# Patient Record
Sex: FEMALE | Race: WHITE | Employment: UNEMPLOYED | URBAN - METROPOLITAN AREA
[De-identification: names, ages, dates, MRNs, and addresses within clinical notes are randomized per-mention and may not be internally consistent; named-entity substitution may affect disease eponyms.]

---

## 2017-06-01 PROCEDURE — 87624 HPV HI-RISK TYP POOLED RSLT: CPT | Performed by: FAMILY MEDICINE

## 2017-06-01 PROCEDURE — 88175 CYTOPATH C/V AUTO FLUID REDO: CPT | Performed by: FAMILY MEDICINE

## 2020-06-11 PROCEDURE — 88342 IMHCHEM/IMCYTCHM 1ST ANTB: CPT | Performed by: RADIOLOGY

## 2020-06-11 PROCEDURE — 88341 IMHCHEM/IMCYTCHM EA ADD ANTB: CPT | Performed by: RADIOLOGY

## 2020-06-11 PROCEDURE — 88305 TISSUE EXAM BY PATHOLOGIST: CPT | Performed by: RADIOLOGY

## 2021-11-16 ENCOUNTER — OFFICE VISIT (OUTPATIENT)
Dept: FAMILY MEDICINE CLINIC | Facility: CLINIC | Age: 66
End: 2021-11-16
Payer: MEDICARE

## 2021-11-16 VITALS
HEIGHT: 65.5 IN | OXYGEN SATURATION: 98 % | BODY MASS INDEX: 31.93 KG/M2 | HEART RATE: 106 BPM | WEIGHT: 194 LBS | RESPIRATION RATE: 16 BRPM | SYSTOLIC BLOOD PRESSURE: 128 MMHG | DIASTOLIC BLOOD PRESSURE: 86 MMHG

## 2021-11-16 DIAGNOSIS — Z12.11 COLON CANCER SCREENING: ICD-10-CM

## 2021-11-16 DIAGNOSIS — R53.83 OTHER FATIGUE: ICD-10-CM

## 2021-11-16 DIAGNOSIS — E78.00 ELEVATED CHOLESTEROL: ICD-10-CM

## 2021-11-16 DIAGNOSIS — Z12.31 ENCOUNTER FOR SCREENING MAMMOGRAM FOR HIGH-RISK PATIENT: ICD-10-CM

## 2021-11-16 DIAGNOSIS — R03.0 ELEVATED BLOOD PRESSURE READING: ICD-10-CM

## 2021-11-16 DIAGNOSIS — F41.9 ANXIETY: ICD-10-CM

## 2021-11-16 DIAGNOSIS — R73.9 HYPERGLYCEMIA: ICD-10-CM

## 2021-11-16 DIAGNOSIS — M72.0 DUPUYTREN'S CONTRACTURE: ICD-10-CM

## 2021-11-16 DIAGNOSIS — Z13.820 SCREENING FOR OSTEOPOROSIS: ICD-10-CM

## 2021-11-16 DIAGNOSIS — F98.8 ATTENTION DEFICIT DISORDER (ADD) IN ADULT: ICD-10-CM

## 2021-11-16 DIAGNOSIS — K76.0 FATTY INFILTRATION OF LIVER: ICD-10-CM

## 2021-11-16 DIAGNOSIS — R74.8 ABNORMAL LIVER ENZYMES: ICD-10-CM

## 2021-11-16 DIAGNOSIS — F32.5 MAJOR DEPRESSIVE DISORDER WITH SINGLE EPISODE, IN FULL REMISSION (HCC): ICD-10-CM

## 2021-11-16 DIAGNOSIS — M25.561 RIGHT KNEE PAIN, UNSPECIFIED CHRONICITY: ICD-10-CM

## 2021-11-16 DIAGNOSIS — K80.20 CHOLELITHIASIS WITHOUT CHOLANGITIS: ICD-10-CM

## 2021-11-16 DIAGNOSIS — Z78.0 ASYMPTOMATIC MENOPAUSE: ICD-10-CM

## 2021-11-16 DIAGNOSIS — Z00.00 ENCOUNTER FOR ANNUAL HEALTH EXAMINATION: Primary | ICD-10-CM

## 2021-11-16 DIAGNOSIS — E55.9 VITAMIN D DEFICIENCY: ICD-10-CM

## 2021-11-16 PROCEDURE — 3008F BODY MASS INDEX DOCD: CPT | Performed by: FAMILY MEDICINE

## 2021-11-16 PROCEDURE — 99387 INIT PM E/M NEW PAT 65+ YRS: CPT | Performed by: FAMILY MEDICINE

## 2021-11-16 PROCEDURE — 96160 PT-FOCUSED HLTH RISK ASSMT: CPT | Performed by: FAMILY MEDICINE

## 2021-11-16 PROCEDURE — 3079F DIAST BP 80-89 MM HG: CPT | Performed by: FAMILY MEDICINE

## 2021-11-16 PROCEDURE — G0438 PPPS, INITIAL VISIT: HCPCS | Performed by: FAMILY MEDICINE

## 2021-11-16 PROCEDURE — 3074F SYST BP LT 130 MM HG: CPT | Performed by: FAMILY MEDICINE

## 2021-11-16 RX ORDER — TRAZODONE HYDROCHLORIDE 50 MG/1
50 TABLET ORAL NIGHTLY
COMMUNITY
Start: 2021-10-05

## 2021-11-16 RX ORDER — DEXTROAMPHETAMINE SACCHARATE, AMPHETAMINE ASPARTATE, DEXTROAMPHETAMINE SULFATE AND AMPHETAMINE SULFATE 5; 5; 5; 5 MG/1; MG/1; MG/1; MG/1
20 TABLET ORAL DAILY
COMMUNITY
Start: 2021-10-26

## 2021-11-16 RX ORDER — METOPROLOL SUCCINATE 25 MG/1
25 TABLET, EXTENDED RELEASE ORAL DAILY
Qty: 60 TABLET | Refills: 0 | Status: SHIPPED | OUTPATIENT
Start: 2021-11-16 | End: 2022-01-08

## 2021-11-16 NOTE — PATIENT INSTRUCTIONS
Kaley Thorpe's SCREENING SCHEDULE   Tests on this list are recommended by your physician but may not be covered, or covered at this frequency, by your insurer. Please check with your insurance carrier before scheduling to verify coverage.    Khushbu King visit.      a[employer] [place] Never done   Pap and Pelvic    Pap   Covered every 2 years for women at normal risk;  Annually if at high risk -  No recommendations at this time    Chlamydia Annually if high risk -  No recommendations at this time   Screening Mammogr 03/12/2020         BUN Annually Lab Results   Component Value Date    BUN 9.0 03/12/2020       Drug Serum Conc Annually No results found for: DIGOXIN, DIG, VALP              Recommended Websites for Advanced Directives    [email or web address]

## 2021-11-16 NOTE — PROGRESS NOTES
HPI:   Oralia Mckeon is a 77year old female who presents for a MA (Medicare Advantage) Supervisit (Once per calendar year). No topic due editable text        She has been screened for Falls and is low risk.       Cognitive Assessment     What d reading     Hyperglycemia     Elevated cholesterol     Dupuytren's contracture     Other fatigue    Wt Readings from Last 3 Encounters:  11/16/21 : 194 lb (88 kg)  05/26/21 : 190 lb (86.2 kg)  03/11/20 : 188 lb (85.3 kg)     Last Cholesterol Labs:   Lab Re use drugs.      REVIEW OF SYSTEMS:   GENERAL: feels well otherwise  SKIN: denies any unusual skin lesions  EYES: denies blurred vision or double vision  HEENT: denies nasal congestion, sinus pain or ST  LUNGS: denies shortness of breath with exertion  CARDI S2 normal, no murmur, rub, or gallop   Abdomen:   Soft, non-tender, bowel sounds active all four quadrants,  no masses, no organomegaly   Pelvic: Deferred   Extremities: Extremities normal, atraumatic, no cyanosis or edema   Pulses: 2+ and symmetric   Skin Future    Elevated cholesterol- check labs  -     LIPID PANEL;  Future    Vitamin D deficiency- check labs   -     VITAMIN D, 25-HYDROXY; Future    Dupuytren's contracture- ortho consult discussed     Other fatigue- check labs   -     CBC WITH DIFFERENTIAL Cardiovascular Disease Screening    Lipid Panel  Cholesterol  Lipoprotein (HDL)  Triglycerides Covered every 5 years for all Medicare beneficiaries without apparent signs or symptoms of cardiovascular disease Lab Results   Component Value Date    CHOLEST 13: -    [id number]: -     Pneumococcal Vaccination(1 of 1 - PPSV23) Never done    Hepatitis B One screening covered for patients with certain risk factors   -  No recommendations at this time    Tetanus Toxoid Not covered by Medicare Part B [facility] medica

## 2021-11-18 ENCOUNTER — HOSPITAL ENCOUNTER (OUTPATIENT)
Dept: GENERAL RADIOLOGY | Facility: HOSPITAL | Age: 66
Discharge: HOME | End: 2021-11-18
Attending: FAMILY MEDICINE
Payer: MEDICARE

## 2021-11-18 ENCOUNTER — HOSPITAL ENCOUNTER (OUTPATIENT)
Dept: MAMMOGRAPHY | Facility: HOSPITAL | Age: 66
Discharge: HOME | End: 2021-11-18
Attending: FAMILY MEDICINE
Payer: MEDICARE

## 2021-11-18 DIAGNOSIS — Z01.89 ENCOUNTER FOR LOWER EXTREMITY COMPARISON IMAGING STUDY: ICD-10-CM

## 2021-11-18 DIAGNOSIS — Z12.31 ENCOUNTER FOR SCREENING MAMMOGRAM FOR HIGH-RISK PATIENT: ICD-10-CM

## 2021-11-18 DIAGNOSIS — M25.561 RIGHT KNEE PAIN, UNSPECIFIED CHRONICITY: ICD-10-CM

## 2021-11-18 PROCEDURE — 77063 BREAST TOMOSYNTHESIS BI: CPT | Performed by: FAMILY MEDICINE

## 2021-11-18 PROCEDURE — 73562 X-RAY EXAM OF KNEE 3: CPT | Performed by: FAMILY MEDICINE

## 2021-11-18 PROCEDURE — 73564 X-RAY EXAM KNEE 4 OR MORE: CPT | Performed by: FAMILY MEDICINE

## 2021-11-18 PROCEDURE — 77067 SCR MAMMO BI INCL CAD: CPT | Performed by: FAMILY MEDICINE

## 2021-11-19 PROCEDURE — 3044F HG A1C LEVEL LT 7.0%: CPT | Performed by: FAMILY MEDICINE

## 2021-11-19 PROCEDURE — 3061F NEG MICROALBUMINURIA REV: CPT | Performed by: FAMILY MEDICINE

## 2021-11-30 ENCOUNTER — NURSE ONLY (OUTPATIENT)
Dept: FAMILY MEDICINE CLINIC | Facility: CLINIC | Age: 66
End: 2021-11-30
Payer: MEDICARE

## 2021-11-30 ENCOUNTER — HOSPITAL ENCOUNTER (OUTPATIENT)
Dept: BONE DENSITY | Age: 66
Discharge: HOME | End: 2021-11-30
Attending: FAMILY MEDICINE
Payer: MEDICARE

## 2021-11-30 VITALS — SYSTOLIC BLOOD PRESSURE: 143 MMHG | DIASTOLIC BLOOD PRESSURE: 89 MMHG

## 2021-11-30 DIAGNOSIS — Z78.0 ASYMPTOMATIC MENOPAUSE: ICD-10-CM

## 2021-11-30 DIAGNOSIS — Z13.820 SCREENING FOR OSTEOPOROSIS: ICD-10-CM

## 2021-11-30 PROCEDURE — 3079F DIAST BP 80-89 MM HG: CPT | Performed by: FAMILY MEDICINE

## 2021-11-30 PROCEDURE — 77080 DXA BONE DENSITY AXIAL: CPT | Performed by: FAMILY MEDICINE

## 2021-11-30 PROCEDURE — 3077F SYST BP >= 140 MM HG: CPT | Performed by: FAMILY MEDICINE

## 2021-12-08 ENCOUNTER — OFFICE VISIT (OUTPATIENT)
Dept: SURGERY | Facility: CLINIC | Age: 66
End: 2021-12-08
Payer: MEDICARE

## 2021-12-08 VITALS
TEMPERATURE: 97 F | SYSTOLIC BLOOD PRESSURE: 154 MMHG | DIASTOLIC BLOOD PRESSURE: 90 MMHG | WEIGHT: 190 LBS | HEIGHT: 64 IN | HEART RATE: 88 BPM | BODY MASS INDEX: 32.44 KG/M2

## 2021-12-08 DIAGNOSIS — Z83.71 ENCOUNTER FOR COLONOSCOPY IN PATIENT WITH FAMILY HISTORY OF COLON POLYPS: ICD-10-CM

## 2021-12-08 DIAGNOSIS — Z12.11 ENCOUNTER FOR COLONOSCOPY IN PATIENT WITH FAMILY HISTORY OF COLON POLYPS: ICD-10-CM

## 2021-12-08 DIAGNOSIS — K80.00 CALCULUS OF GALLBLADDER WITH ACUTE CHOLECYSTITIS WITHOUT OBSTRUCTION: Primary | ICD-10-CM

## 2021-12-08 PROCEDURE — 3077F SYST BP >= 140 MM HG: CPT | Performed by: SURGERY

## 2021-12-08 PROCEDURE — 99204 OFFICE O/P NEW MOD 45 MIN: CPT | Performed by: SURGERY

## 2021-12-08 PROCEDURE — 3008F BODY MASS INDEX DOCD: CPT | Performed by: SURGERY

## 2021-12-08 PROCEDURE — 3080F DIAST BP >= 90 MM HG: CPT | Performed by: SURGERY

## 2021-12-08 NOTE — H&P
New Patient Visit Note       Active Problems      1. Calculus of gallbladder with acute cholecystitis without obstruction    2.  Encounter for colonoscopy in patient with family history of colon polyps        Chief Complaint   Patient presents with:  Reji Dempsey history and social history have been reviewed by me today.     Family History   Problem Relation Age of Onset   • Breast Cancer Paternal Cousin Female         2 OF THEM, ONE IS ALIVE     Social History    Socioeconomic History      Marital status:  for chest pain, palpitations and leg swelling. Gastrointestinal: Negative for abdominal distention, abdominal pain, anal bleeding, blood in stool, constipation, diarrhea, nausea and vomiting.    Genitourinary: Negative for difficulty urinating, dysuria, f Speech: Speech normal.         Behavior: Behavior normal.         Thought Content: Thought content normal.         Judgment: Judgment normal.           Ultrasound liver    May 2020   I personally reviewed the report.     US LIVER (CPT=76705)   CLINICAL IN the patient in detail. Risks included, but are not limited to, crampy abdominal pain and perforation. The patient is agreeable to proceed with the colonoscopy.   · Because of the severity of her gallbladder symptoms, I recommended we proceed with gallblad

## 2021-12-08 NOTE — H&P (VIEW-ONLY)
New Patient Visit Note       Active Problems      1. Calculus of gallbladder with acute cholecystitis without obstruction    2.  Encounter for colonoscopy in patient with family history of colon polyps        Chief Complaint   Patient presents with:  Cornel Harrison history and social history have been reviewed by me today.     Family History   Problem Relation Age of Onset   • Breast Cancer Paternal Cousin Female         2 OF THEM, ONE IS ALIVE     Social History    Socioeconomic History      Marital status:  for chest pain, palpitations and leg swelling. Gastrointestinal: Negative for abdominal distention, abdominal pain, anal bleeding, blood in stool, constipation, diarrhea, nausea and vomiting.    Genitourinary: Negative for difficulty urinating, dysuria, f Speech: Speech normal.         Behavior: Behavior normal.         Thought Content: Thought content normal.         Judgment: Judgment normal.           Ultrasound liver    May 2020   I personally reviewed the report.     US LIVER (CPT=76705)   CLINICAL IN the patient in detail. Risks included, but are not limited to, crampy abdominal pain and perforation. The patient is agreeable to proceed with the colonoscopy.   · Because of the severity of her gallbladder symptoms, I recommended we proceed with gallblad

## 2021-12-15 RX ORDER — MELATONIN
1000 DAILY
COMMUNITY

## 2021-12-15 RX ORDER — ACETAMINOPHEN 500 MG
1000 TABLET ORAL ONCE
Status: CANCELLED | OUTPATIENT
Start: 2021-12-15 | End: 2021-12-15

## 2021-12-18 ENCOUNTER — EKG ENCOUNTER (OUTPATIENT)
Dept: LAB | Facility: HOSPITAL | Age: 66
End: 2021-12-18
Attending: SURGERY
Payer: MEDICARE

## 2021-12-18 DIAGNOSIS — K80.00 CALCULUS OF GALLBLADDER WITH ACUTE CHOLECYSTITIS WITHOUT OBSTRUCTION: ICD-10-CM

## 2021-12-18 PROCEDURE — 93005 ELECTROCARDIOGRAM TRACING: CPT

## 2021-12-18 PROCEDURE — 93010 ELECTROCARDIOGRAM REPORT: CPT | Performed by: INTERNAL MEDICINE

## 2021-12-20 ENCOUNTER — ANESTHESIA EVENT (OUTPATIENT)
Dept: SURGERY | Facility: HOSPITAL | Age: 66
End: 2021-12-20
Payer: MEDICARE

## 2021-12-20 NOTE — PAT NURSING NOTE
Chart reviewed by anesthesiologist, Dr. Janay Greco for abnormal EKG. Received an order for medical clearance. Faxed this request to the surgeon and Dr. Slime Núñez. Received fax confirmation.  Telephoned Dr. Vivien Champion office and spoke to [place] and informed of above

## 2021-12-21 ENCOUNTER — TELEPHONE (OUTPATIENT)
Dept: FAMILY MEDICINE CLINIC | Facility: CLINIC | Age: 66
End: 2021-12-21

## 2021-12-22 ENCOUNTER — OFFICE VISIT (OUTPATIENT)
Dept: FAMILY MEDICINE CLINIC | Facility: CLINIC | Age: 66
End: 2021-12-22
Payer: MEDICARE

## 2021-12-22 VITALS
DIASTOLIC BLOOD PRESSURE: 84 MMHG | OXYGEN SATURATION: 97 % | RESPIRATION RATE: 16 BRPM | HEIGHT: 64 IN | HEART RATE: 62 BPM | BODY MASS INDEX: 33.12 KG/M2 | SYSTOLIC BLOOD PRESSURE: 116 MMHG | WEIGHT: 194 LBS

## 2021-12-22 DIAGNOSIS — I10 HYPERTENSION, UNSPECIFIED TYPE: ICD-10-CM

## 2021-12-22 DIAGNOSIS — F41.9 ANXIETY: ICD-10-CM

## 2021-12-22 DIAGNOSIS — I12.9 TYPE 2 DM WITH CKD STAGE 3 AND HYPERTENSION (HCC): ICD-10-CM

## 2021-12-22 DIAGNOSIS — F32.5 MAJOR DEPRESSIVE DISORDER WITH SINGLE EPISODE, IN FULL REMISSION (HCC): ICD-10-CM

## 2021-12-22 DIAGNOSIS — R94.31 ABNORMAL EKG: ICD-10-CM

## 2021-12-22 DIAGNOSIS — N18.30 TYPE 2 DM WITH CKD STAGE 3 AND HYPERTENSION (HCC): ICD-10-CM

## 2021-12-22 DIAGNOSIS — K80.00 CALCULUS OF GALLBLADDER WITH ACUTE CHOLECYSTITIS WITHOUT OBSTRUCTION: ICD-10-CM

## 2021-12-22 DIAGNOSIS — E11.22 TYPE 2 DM WITH CKD STAGE 3 AND HYPERTENSION (HCC): ICD-10-CM

## 2021-12-22 DIAGNOSIS — F98.8 ATTENTION DEFICIT DISORDER (ADD) IN ADULT: ICD-10-CM

## 2021-12-22 DIAGNOSIS — Z01.818 PREOP EXAMINATION: Primary | ICD-10-CM

## 2021-12-22 PROCEDURE — 99214 OFFICE O/P EST MOD 30 MIN: CPT | Performed by: FAMILY MEDICINE

## 2021-12-22 PROCEDURE — 3074F SYST BP LT 130 MM HG: CPT | Performed by: FAMILY MEDICINE

## 2021-12-22 PROCEDURE — 3079F DIAST BP 80-89 MM HG: CPT | Performed by: FAMILY MEDICINE

## 2021-12-22 PROCEDURE — 3008F BODY MASS INDEX DOCD: CPT | Performed by: FAMILY MEDICINE

## 2021-12-22 RX ORDER — LOSARTAN POTASSIUM 25 MG/1
25 TABLET ORAL DAILY
Qty: 30 TABLET | Refills: 0 | Status: SHIPPED | OUTPATIENT
Start: 2021-12-22 | End: 2022-01-11

## 2021-12-22 NOTE — PROGRESS NOTES
Sagar Degroot is a 77year old female who presents for preop clearance for   LAPAROSCOPIC CHOLECYSTECTOMY WITH CHOLANGIOGRAM    Dr. Rudy Silverio reqesting clearance. 12/27/2021   At [facility]   HPI:   Pt complains of intermittent abdominal pain.   Pt mouth daily. 60 tablet 0   • REXULTI 0.5 MG Oral Tab Take 0.5 mg by mouth daily. • VYVANSE 50 MG Oral Cap Take 50 mg by mouth daily.      • FLUTICASONE PROPIONATE 50 MCG/ACT Nasal Suspension SHAKE LIQUID AND USE 1 SPRAY IN EACH NOSTRIL DAILY (Patient ta headaches  PSYCHE: denies depression or anxiety  HEMATOLOGIC: denies hx of anemia  ENDOCRINE: denies thyroid history  ALL/ASTHMA: denies asthma    EXAM:   /84   Pulse 62   Resp 16   Ht 5' 4\" (1.626 m)   Wt 194 lb (88 kg)   SpO2 97%   BMI 33.30 kg/m² changing meds after surgery   - CARD ECHO STRESS ECHO/REST AND STRESS(CPT=93350/77516 DMG [id number]); Future  - RAPID SARS-COV-2 BY PCR; Future    8. Abnormal EKG    - CARD ECHO STRESS ECHO/REST AND STRESS(CPT=93350/86820 DMG 25373);  Future  - RAPID SARS-COV-2

## 2021-12-23 ENCOUNTER — LAB ENCOUNTER (OUTPATIENT)
Dept: LAB | Facility: HOSPITAL | Age: 66
End: 2021-12-23
Attending: FAMILY MEDICINE
Payer: MEDICARE

## 2021-12-23 DIAGNOSIS — I10 HYPERTENSION, UNSPECIFIED TYPE: ICD-10-CM

## 2021-12-23 DIAGNOSIS — R94.31 ABNORMAL EKG: ICD-10-CM

## 2021-12-23 DIAGNOSIS — E11.22 TYPE 2 DM WITH CKD STAGE 3 AND HYPERTENSION (HCC): ICD-10-CM

## 2021-12-23 DIAGNOSIS — Z01.818 PREOP EXAMINATION: ICD-10-CM

## 2021-12-23 DIAGNOSIS — N18.30 TYPE 2 DM WITH CKD STAGE 3 AND HYPERTENSION (HCC): ICD-10-CM

## 2021-12-23 DIAGNOSIS — I12.9 TYPE 2 DM WITH CKD STAGE 3 AND HYPERTENSION (HCC): ICD-10-CM

## 2021-12-24 ENCOUNTER — HOSPITAL ENCOUNTER (OUTPATIENT)
Dept: CV DIAGNOSTICS | Facility: HOSPITAL | Age: 66
Discharge: HOME | End: 2021-12-24
Attending: FAMILY MEDICINE
Payer: MEDICARE

## 2021-12-24 ENCOUNTER — LAB ENCOUNTER (OUTPATIENT)
Dept: LAB | Facility: HOSPITAL | Age: 66
End: 2021-12-24
Attending: SURGERY
Payer: MEDICARE

## 2021-12-24 DIAGNOSIS — I12.9 TYPE 2 DM WITH CKD STAGE 3 AND HYPERTENSION (HCC): ICD-10-CM

## 2021-12-24 DIAGNOSIS — F41.9 ANXIETY: ICD-10-CM

## 2021-12-24 DIAGNOSIS — K80.00 CALCULUS OF GALLBLADDER WITH ACUTE CHOLECYSTITIS WITHOUT OBSTRUCTION: ICD-10-CM

## 2021-12-24 DIAGNOSIS — R94.31 ABNORMAL EKG: ICD-10-CM

## 2021-12-24 DIAGNOSIS — N18.30 TYPE 2 DM WITH CKD STAGE 3 AND HYPERTENSION (HCC): ICD-10-CM

## 2021-12-24 DIAGNOSIS — I10 HYPERTENSION, UNSPECIFIED TYPE: ICD-10-CM

## 2021-12-24 DIAGNOSIS — Z01.818 PREOP EXAMINATION: ICD-10-CM

## 2021-12-24 DIAGNOSIS — F98.8 ATTENTION DEFICIT DISORDER (ADD) IN ADULT: ICD-10-CM

## 2021-12-24 DIAGNOSIS — F32.5 MAJOR DEPRESSIVE DISORDER WITH SINGLE EPISODE, IN FULL REMISSION (HCC): ICD-10-CM

## 2021-12-24 DIAGNOSIS — E11.22 TYPE 2 DM WITH CKD STAGE 3 AND HYPERTENSION (HCC): ICD-10-CM

## 2021-12-24 PROCEDURE — 93017 CV STRESS TEST TRACING ONLY: CPT | Performed by: FAMILY MEDICINE

## 2021-12-24 PROCEDURE — 93018 CV STRESS TEST I&R ONLY: CPT | Performed by: FAMILY MEDICINE

## 2021-12-24 PROCEDURE — 93350 STRESS TTE ONLY: CPT | Performed by: FAMILY MEDICINE

## 2021-12-27 ENCOUNTER — APPOINTMENT (OUTPATIENT)
Dept: GENERAL RADIOLOGY | Facility: HOSPITAL | Age: 66
End: 2021-12-27
Attending: SURGERY
Payer: MEDICARE

## 2021-12-27 ENCOUNTER — HOSPITAL ENCOUNTER (OUTPATIENT)
Facility: HOSPITAL | Age: 66
Setting detail: HOSPITAL OUTPATIENT SURGERY
Discharge: HOME | End: 2021-12-27
Attending: SURGERY | Admitting: SURGERY
Payer: MEDICARE

## 2021-12-27 ENCOUNTER — ANESTHESIA (OUTPATIENT)
Dept: SURGERY | Facility: HOSPITAL | Age: 66
End: 2021-12-27
Payer: MEDICARE

## 2021-12-27 VITALS
BODY MASS INDEX: 33.12 KG/M2 | OXYGEN SATURATION: 92 % | HEART RATE: 49 BPM | RESPIRATION RATE: 16 BRPM | TEMPERATURE: 97 F | DIASTOLIC BLOOD PRESSURE: 62 MMHG | WEIGHT: 194 LBS | HEIGHT: 64 IN | SYSTOLIC BLOOD PRESSURE: 108 MMHG

## 2021-12-27 DIAGNOSIS — K81.9 CHOLECYSTITIS: ICD-10-CM

## 2021-12-27 DIAGNOSIS — K80.00 CALCULUS OF GALLBLADDER WITH ACUTE CHOLECYSTITIS WITHOUT OBSTRUCTION: Primary | ICD-10-CM

## 2021-12-27 PROCEDURE — BF100ZZ FLUOROSCOPY OF BILE DUCTS USING HIGH OSMOLAR CONTRAST: ICD-10-PCS | Performed by: SURGERY

## 2021-12-27 PROCEDURE — 74300 X-RAY BILE DUCTS/PANCREAS: CPT | Performed by: SURGERY

## 2021-12-27 PROCEDURE — 88304 TISSUE EXAM BY PATHOLOGIST: CPT | Performed by: SURGERY

## 2021-12-27 PROCEDURE — 0FT44ZZ RESECTION OF GALLBLADDER, PERCUTANEOUS ENDOSCOPIC APPROACH: ICD-10-PCS | Performed by: SURGERY

## 2021-12-27 RX ORDER — HYDROCODONE BITARTRATE AND ACETAMINOPHEN 5; 325 MG/1; MG/1
1 TABLET ORAL EVERY 6 HOURS PRN
Qty: 10 TABLET | Refills: 0 | Status: SHIPPED | OUTPATIENT
Start: 2021-12-27

## 2021-12-27 RX ORDER — METOCLOPRAMIDE HYDROCHLORIDE 5 MG/ML
INJECTION INTRAMUSCULAR; INTRAVENOUS AS NEEDED
Status: DISCONTINUED | OUTPATIENT
Start: 2021-12-27 | End: 2021-12-27 | Stop reason: SURG

## 2021-12-27 RX ORDER — ONDANSETRON 2 MG/ML
4 INJECTION INTRAMUSCULAR; INTRAVENOUS AS NEEDED
Status: DISCONTINUED | OUTPATIENT
Start: 2021-12-27 | End: 2021-12-27

## 2021-12-27 RX ORDER — ROCURONIUM BROMIDE 10 MG/ML
INJECTION, SOLUTION INTRAVENOUS AS NEEDED
Status: DISCONTINUED | OUTPATIENT
Start: 2021-12-27 | End: 2021-12-27 | Stop reason: SURG

## 2021-12-27 RX ORDER — DEXAMETHASONE SODIUM PHOSPHATE 4 MG/ML
VIAL (ML) INJECTION AS NEEDED
Status: DISCONTINUED | OUTPATIENT
Start: 2021-12-27 | End: 2021-12-27 | Stop reason: SURG

## 2021-12-27 RX ORDER — HYDRALAZINE HYDROCHLORIDE 20 MG/ML
INJECTION INTRAMUSCULAR; INTRAVENOUS AS NEEDED
Status: DISCONTINUED | OUTPATIENT
Start: 2021-12-27 | End: 2021-12-27 | Stop reason: SURG

## 2021-12-27 RX ORDER — SODIUM CHLORIDE, SODIUM LACTATE, POTASSIUM CHLORIDE, CALCIUM CHLORIDE 600; 310; 30; 20 MG/100ML; MG/100ML; MG/100ML; MG/100ML
INJECTION, SOLUTION INTRAVENOUS CONTINUOUS
Status: DISCONTINUED | OUTPATIENT
Start: 2021-12-27 | End: 2021-12-27

## 2021-12-27 RX ORDER — NALOXONE HYDROCHLORIDE 0.4 MG/ML
80 INJECTION, SOLUTION INTRAMUSCULAR; INTRAVENOUS; SUBCUTANEOUS AS NEEDED
Status: DISCONTINUED | OUTPATIENT
Start: 2021-12-27 | End: 2021-12-27

## 2021-12-27 RX ORDER — HYDROMORPHONE HYDROCHLORIDE 1 MG/ML
INJECTION, SOLUTION INTRAMUSCULAR; INTRAVENOUS; SUBCUTANEOUS
Status: COMPLETED
Start: 2021-12-27 | End: 2021-12-27

## 2021-12-27 RX ORDER — HYDROCODONE BITARTRATE AND ACETAMINOPHEN 5; 325 MG/1; MG/1
1 TABLET ORAL AS NEEDED
Status: DISCONTINUED | OUTPATIENT
Start: 2021-12-27 | End: 2021-12-27

## 2021-12-27 RX ORDER — HEPARIN SODIUM 5000 [USP'U]/ML
5000 INJECTION, SOLUTION INTRAVENOUS; SUBCUTANEOUS ONCE
Status: COMPLETED | OUTPATIENT
Start: 2021-12-27 | End: 2021-12-27

## 2021-12-27 RX ORDER — NEOSTIGMINE METHYLSULFATE 1 MG/ML
INJECTION INTRAVENOUS AS NEEDED
Status: DISCONTINUED | OUTPATIENT
Start: 2021-12-27 | End: 2021-12-27 | Stop reason: SURG

## 2021-12-27 RX ORDER — BUPIVACAINE HYDROCHLORIDE AND EPINEPHRINE 5; 5 MG/ML; UG/ML
INJECTION, SOLUTION EPIDURAL; INTRACAUDAL; PERINEURAL AS NEEDED
Status: DISCONTINUED | OUTPATIENT
Start: 2021-12-27 | End: 2021-12-27 | Stop reason: HOSPADM

## 2021-12-27 RX ORDER — HYDROMORPHONE HYDROCHLORIDE 1 MG/ML
0.4 INJECTION, SOLUTION INTRAMUSCULAR; INTRAVENOUS; SUBCUTANEOUS EVERY 5 MIN PRN
Status: DISCONTINUED | OUTPATIENT
Start: 2021-12-27 | End: 2021-12-27

## 2021-12-27 RX ORDER — ONDANSETRON 2 MG/ML
INJECTION INTRAMUSCULAR; INTRAVENOUS AS NEEDED
Status: DISCONTINUED | OUTPATIENT
Start: 2021-12-27 | End: 2021-12-27 | Stop reason: SURG

## 2021-12-27 RX ORDER — ACETAMINOPHEN 500 MG
1000 TABLET ORAL ONCE
COMMUNITY
End: 2021-12-27

## 2021-12-27 RX ORDER — KETOROLAC TROMETHAMINE 30 MG/ML
INJECTION, SOLUTION INTRAMUSCULAR; INTRAVENOUS AS NEEDED
Status: DISCONTINUED | OUTPATIENT
Start: 2021-12-27 | End: 2021-12-27 | Stop reason: SURG

## 2021-12-27 RX ORDER — HYDROCODONE BITARTRATE AND ACETAMINOPHEN 5; 325 MG/1; MG/1
2 TABLET ORAL AS NEEDED
Status: DISCONTINUED | OUTPATIENT
Start: 2021-12-27 | End: 2021-12-27

## 2021-12-27 RX ORDER — GLYCOPYRROLATE 0.2 MG/ML
INJECTION, SOLUTION INTRAMUSCULAR; INTRAVENOUS AS NEEDED
Status: DISCONTINUED | OUTPATIENT
Start: 2021-12-27 | End: 2021-12-27 | Stop reason: SURG

## 2021-12-27 RX ORDER — LIDOCAINE HYDROCHLORIDE 10 MG/ML
INJECTION, SOLUTION EPIDURAL; INFILTRATION; INTRACAUDAL; PERINEURAL AS NEEDED
Status: DISCONTINUED | OUTPATIENT
Start: 2021-12-27 | End: 2021-12-27 | Stop reason: SURG

## 2021-12-27 RX ADMIN — METOCLOPRAMIDE HYDROCHLORIDE 10 MG: 5 INJECTION INTRAMUSCULAR; INTRAVENOUS at 14:49:00

## 2021-12-27 RX ADMIN — SODIUM CHLORIDE, SODIUM LACTATE, POTASSIUM CHLORIDE, CALCIUM CHLORIDE: 600; 310; 30; 20 INJECTION, SOLUTION INTRAVENOUS at 15:44:00

## 2021-12-27 RX ADMIN — ONDANSETRON 4 MG: 2 INJECTION INTRAMUSCULAR; INTRAVENOUS at 15:40:00

## 2021-12-27 RX ADMIN — KETOROLAC TROMETHAMINE 30 MG: 30 INJECTION, SOLUTION INTRAMUSCULAR; INTRAVENOUS at 15:40:00

## 2021-12-27 RX ADMIN — NEOSTIGMINE METHYLSULFATE 4 MG: 1 INJECTION INTRAVENOUS at 15:34:00

## 2021-12-27 RX ADMIN — DEXAMETHASONE SODIUM PHOSPHATE 4 MG: 4 MG/ML VIAL (ML) INJECTION at 14:49:00

## 2021-12-27 RX ADMIN — HYDRALAZINE HYDROCHLORIDE 10 MG: 20 INJECTION INTRAMUSCULAR; INTRAVENOUS at 15:15:00

## 2021-12-27 RX ADMIN — GLYCOPYRROLATE 0.8 MG: 0.2 INJECTION, SOLUTION INTRAMUSCULAR; INTRAVENOUS at 15:34:00

## 2021-12-27 RX ADMIN — ROCURONIUM BROMIDE 40 MG: 10 INJECTION, SOLUTION INTRAVENOUS at 14:45:00

## 2021-12-27 RX ADMIN — LIDOCAINE HYDROCHLORIDE 50 MG: 10 INJECTION, SOLUTION EPIDURAL; INFILTRATION; INTRACAUDAL; PERINEURAL at 14:45:00

## 2021-12-27 RX ADMIN — HYDRALAZINE HYDROCHLORIDE 10 MG: 20 INJECTION INTRAMUSCULAR; INTRAVENOUS at 15:17:00

## 2021-12-27 RX ADMIN — SODIUM CHLORIDE, SODIUM LACTATE, POTASSIUM CHLORIDE, CALCIUM CHLORIDE: 600; 310; 30; 20 INJECTION, SOLUTION INTRAVENOUS at 15:07:00

## 2021-12-27 RX ADMIN — SODIUM CHLORIDE, SODIUM LACTATE, POTASSIUM CHLORIDE, CALCIUM CHLORIDE: 600; 310; 30; 20 INJECTION, SOLUTION INTRAVENOUS at 14:41:00

## 2021-12-27 NOTE — INTERVAL H&P NOTE
Pre-op Diagnosis: Cholecystitis [K81.9]    The above referenced H&P was reviewed by Matti Neville MD on 12/27/2021, the patient was examined and no significant changes have occurred in the patient's condition since the H&P was performed.   I discussed

## 2021-12-27 NOTE — ANESTHESIA PROCEDURE NOTES
Airway  Date/Time: 12/27/2021 2:47 PM  Urgency: elective      General Information and Staff    Patient location during procedure: OR  Anesthesiologist: Jj Colin MD  Performed: anesthesiologist     Indications and Patient Condition  Indications for a

## 2021-12-27 NOTE — ANESTHESIA PREPROCEDURE EVALUATION
PRE-OP EVALUATION    Patient Name: Mattie Wells    Admit Diagnosis: Cholecystitis [K81.9]    Pre-op Diagnosis: Cholecystitis [K81.9]    LAPAROSCOPIC CHOLECYSTECTOMY WITH CHOLANGIOGRAM    Anesthesia Procedure: [address] Disp: , Rfl: 2  ClonazePAM (KLONOPIN) 0.5 MG Oral Tab, Take 0.5 mg by mouth 2 (two) times daily as needed for Anxiety. , Disp: , Rfl:         Allergies: Topamax [Topiramate] and Morphine      Anesthesia Evaluation    Patient summary reviewed.     Anesthetic 11/19/2021    CA 9.6 11/19/2021            Airway      Mallampati: III  Mouth opening: 3 FB  TM distance: 4 - 6 cm  Neck ROM: full Cardiovascular    Cardiovascular exam normal.         Dental    No notable dental history.   Dental appliance(s): upper dentur

## 2021-12-27 NOTE — OPERATIVE REPORT
[facility]  Op Note    Billye Porsha Location: OR   CSN [id number] MRN [id number]   Admission Date 12/27/2021 Operation Date 12/27/2021   Attending Physician Liana Kenney MD Operating Physician Kendy Almeida MD   DATE OF OPERATION:  1 pneumoperitoneum was instituted without difficulty. The 11 mm trocar was passed through this incision site.  The abdomen was inspected and found to be grossly normal. Under direct vision, a 5 mm subxiphoid port and two right-sided lateral 5 mm ports were pl tolerated the procedure well, was extubated in the OR, and went to the PACU in good condition.     Jennifer Guzman MD

## 2021-12-27 NOTE — ANESTHESIA POSTPROCEDURE EVALUATION
[address] Patient Status:  Hospital Outpatient Surgery   Age/Gender 77year old female MRN [id number]   Location [address] [facility] Attending Meagan Vences MD   [facility] Day # 0 TOÑO Mccormick DO

## 2022-01-04 ENCOUNTER — OFFICE VISIT (OUTPATIENT)
Dept: SURGERY | Facility: CLINIC | Age: 67
End: 2022-01-04

## 2022-01-04 VITALS — HEIGHT: 64 IN | WEIGHT: 190 LBS | BODY MASS INDEX: 32.44 KG/M2 | TEMPERATURE: 97 F

## 2022-01-04 DIAGNOSIS — Z98.890 POST-OPERATIVE STATE: Primary | ICD-10-CM

## 2022-01-04 PROCEDURE — 3008F BODY MASS INDEX DOCD: CPT | Performed by: PHYSICIAN ASSISTANT

## 2022-01-04 PROCEDURE — 99024 POSTOP FOLLOW-UP VISIT: CPT | Performed by: PHYSICIAN ASSISTANT

## 2022-01-04 NOTE — PROGRESS NOTES
Post Operative Visit Note       Active Problems  1. Post-operative state         Chief Complaint   Patient presents with:  Post-Op: PO 12/27 lap luis hebert  ESH- PT states has abd pain denies n/v fever chills states has normal BMs          History of Presen Take 1,000 mcg by mouth daily. , Disp: , Rfl:   •  amphetamine-dextroamphetamine 20 MG Oral Tab, Take 20 mg by mouth daily. , Disp: , Rfl:   •  traZODone 50 MG Oral Tab, Take 50 mg by mouth nightly., Disp: , Rfl:   •  metoprolol succinate 25 MG Oral Tablet 2 with diet as tolerated. 3. The patient is to continue with lifting restrictions of no more than 20 pounds for 4 weeks from the date of the surgery. 4. Surgical pathology was discussed with the patient. 5. All questions and concerns were answered.   6. Th

## 2022-01-08 DIAGNOSIS — R03.0 ELEVATED BLOOD PRESSURE READING: ICD-10-CM

## 2022-01-08 RX ORDER — METOPROLOL SUCCINATE 25 MG/1
TABLET, EXTENDED RELEASE ORAL
Qty: 60 TABLET | Refills: 0 | Status: SHIPPED | OUTPATIENT
Start: 2022-01-08 | End: 2022-01-11 | Stop reason: ALTCHOICE

## 2022-01-11 ENCOUNTER — OFFICE VISIT (OUTPATIENT)
Dept: FAMILY MEDICINE CLINIC | Facility: CLINIC | Age: 67
End: 2022-01-11
Payer: MEDICARE

## 2022-01-11 VITALS
WEIGHT: 194 LBS | HEART RATE: 80 BPM | RESPIRATION RATE: 16 BRPM | HEIGHT: 64 IN | BODY MASS INDEX: 33.12 KG/M2 | SYSTOLIC BLOOD PRESSURE: 134 MMHG | DIASTOLIC BLOOD PRESSURE: 86 MMHG | OXYGEN SATURATION: 98 %

## 2022-01-11 DIAGNOSIS — M17.0 BILATERAL PRIMARY OSTEOARTHRITIS OF KNEE: ICD-10-CM

## 2022-01-11 DIAGNOSIS — M24.559 HIP FLEXOR TIGHTNESS, UNSPECIFIED LATERALITY: ICD-10-CM

## 2022-01-11 DIAGNOSIS — I10 HYPERTENSION, UNSPECIFIED TYPE: Primary | ICD-10-CM

## 2022-01-11 PROCEDURE — 3079F DIAST BP 80-89 MM HG: CPT | Performed by: FAMILY MEDICINE

## 2022-01-11 PROCEDURE — 99213 OFFICE O/P EST LOW 20 MIN: CPT | Performed by: FAMILY MEDICINE

## 2022-01-11 PROCEDURE — 3075F SYST BP GE 130 - 139MM HG: CPT | Performed by: FAMILY MEDICINE

## 2022-01-11 PROCEDURE — 3008F BODY MASS INDEX DOCD: CPT | Performed by: FAMILY MEDICINE

## 2022-01-11 RX ORDER — LOSARTAN POTASSIUM 25 MG/1
25 TABLET ORAL DAILY
Qty: 90 TABLET | Refills: 1 | Status: SHIPPED | OUTPATIENT
Start: 2022-01-11

## 2022-01-11 NOTE — PROGRESS NOTES
Alida López is a 77year old female here for bp follow up and right hip pain and bilateral knee pain   HPI:     Pt reports the beta blocker was too much   Losartan was better  No SE     Discussed knee x-rays - OA     Pt c/o right hip pain   No injur History:  Social History    Tobacco Use      Smoking status: Never Smoker      Smokeless tobacco: Never Used    Vaping Use      Vaping Use: Never used    Alcohol use:  Yes      Alcohol/week: 2.0 standard drinks      Types: 2 Glasses of wine per week    Drug daily.  Dispense: 90 tablet; Refill: 1    2.  Hip flexor tightness, unspecified laterality    - OP REFERRAL TO [facility]    3. Bilateral primary osteoarthritis of knee    - OP REFERRAL TO [facility]    RTC 6 mo or

## 2022-02-11 ENCOUNTER — TELEPHONE (OUTPATIENT)
Dept: PHYSICAL THERAPY | Facility: HOSPITAL | Age: 67
End: 2022-02-11

## 2022-02-14 ENCOUNTER — OFFICE VISIT (OUTPATIENT)
Dept: PHYSICAL THERAPY | Age: 67
End: 2022-02-14
Attending: FAMILY MEDICINE
Payer: MEDICARE

## 2022-02-14 DIAGNOSIS — M24.559 HIP FLEXOR TIGHTNESS, UNSPECIFIED LATERALITY: ICD-10-CM

## 2022-02-14 DIAGNOSIS — M17.0 BILATERAL PRIMARY OSTEOARTHRITIS OF KNEE: ICD-10-CM

## 2022-02-14 PROCEDURE — 97110 THERAPEUTIC EXERCISES: CPT

## 2022-02-14 PROCEDURE — 97161 PT EVAL LOW COMPLEX 20 MIN: CPT

## 2022-02-16 ENCOUNTER — OFFICE VISIT (OUTPATIENT)
Dept: PHYSICAL THERAPY | Age: 67
End: 2022-02-16
Attending: FAMILY MEDICINE
Payer: MEDICARE

## 2022-02-16 PROCEDURE — 97140 MANUAL THERAPY 1/> REGIONS: CPT

## 2022-02-16 PROCEDURE — 97110 THERAPEUTIC EXERCISES: CPT

## 2022-02-21 ENCOUNTER — OFFICE VISIT (OUTPATIENT)
Dept: PHYSICAL THERAPY | Age: 67
End: 2022-02-21
Attending: FAMILY MEDICINE
Payer: MEDICARE

## 2022-02-21 ENCOUNTER — LAB ENCOUNTER (OUTPATIENT)
Dept: LAB | Age: 67
End: 2022-02-21
Attending: Other
Payer: MEDICARE

## 2022-02-21 DIAGNOSIS — M19.90 SENILE ARTHRITIS: Primary | ICD-10-CM

## 2022-02-21 LAB — ERYTHROCYTE [SEDIMENTATION RATE] IN BLOOD: 8 MM/HR

## 2022-02-21 PROCEDURE — 36415 COLL VENOUS BLD VENIPUNCTURE: CPT

## 2022-02-21 PROCEDURE — 97110 THERAPEUTIC EXERCISES: CPT

## 2022-02-21 PROCEDURE — 97140 MANUAL THERAPY 1/> REGIONS: CPT

## 2022-02-21 PROCEDURE — 85652 RBC SED RATE AUTOMATED: CPT

## 2022-02-23 ENCOUNTER — OFFICE VISIT (OUTPATIENT)
Dept: PHYSICAL THERAPY | Age: 67
End: 2022-02-23
Attending: FAMILY MEDICINE
Payer: MEDICARE

## 2022-02-23 PROCEDURE — 97110 THERAPEUTIC EXERCISES: CPT

## 2022-02-23 PROCEDURE — 97140 MANUAL THERAPY 1/> REGIONS: CPT

## 2022-02-28 ENCOUNTER — OFFICE VISIT (OUTPATIENT)
Dept: PHYSICAL THERAPY | Age: 67
End: 2022-02-28
Attending: FAMILY MEDICINE
Payer: MEDICARE

## 2022-02-28 PROCEDURE — 97140 MANUAL THERAPY 1/> REGIONS: CPT

## 2022-02-28 PROCEDURE — 97110 THERAPEUTIC EXERCISES: CPT

## 2022-03-02 ENCOUNTER — OFFICE VISIT (OUTPATIENT)
Dept: PHYSICAL THERAPY | Age: 67
End: 2022-03-02
Attending: FAMILY MEDICINE
Payer: MEDICARE

## 2022-03-02 PROCEDURE — 97110 THERAPEUTIC EXERCISES: CPT

## 2022-03-02 PROCEDURE — 97140 MANUAL THERAPY 1/> REGIONS: CPT

## 2022-03-07 ENCOUNTER — OFFICE VISIT (OUTPATIENT)
Dept: PHYSICAL THERAPY | Age: 67
End: 2022-03-07
Attending: FAMILY MEDICINE
Payer: MEDICARE

## 2022-03-07 PROCEDURE — 97110 THERAPEUTIC EXERCISES: CPT

## 2022-03-07 PROCEDURE — 97140 MANUAL THERAPY 1/> REGIONS: CPT

## 2022-03-09 ENCOUNTER — OFFICE VISIT (OUTPATIENT)
Dept: PHYSICAL THERAPY | Age: 67
End: 2022-03-09
Attending: FAMILY MEDICINE
Payer: MEDICARE

## 2022-03-09 PROCEDURE — 97110 THERAPEUTIC EXERCISES: CPT

## 2022-07-14 DIAGNOSIS — I10 HYPERTENSION, UNSPECIFIED TYPE: ICD-10-CM

## 2022-07-15 RX ORDER — LOSARTAN POTASSIUM 25 MG/1
25 TABLET ORAL DAILY
Qty: 90 TABLET | Refills: 1 | Status: SHIPPED | OUTPATIENT
Start: 2022-07-15

## 2022-07-28 ENCOUNTER — LAB ENCOUNTER (OUTPATIENT)
Dept: LAB | Age: 67
End: 2022-07-28
Attending: Other
Payer: MEDICARE

## 2022-07-28 DIAGNOSIS — E55.9 AVITAMINOSIS D: Primary | ICD-10-CM

## 2022-07-28 LAB — VIT D+METAB SERPL-MCNC: 111.1 NG/ML (ref 30–100)

## 2022-07-28 PROCEDURE — 82306 VITAMIN D 25 HYDROXY: CPT

## 2022-07-28 PROCEDURE — 36415 COLL VENOUS BLD VENIPUNCTURE: CPT

## 2022-10-18 ENCOUNTER — OFFICE VISIT (OUTPATIENT)
Dept: FAMILY MEDICINE CLINIC | Facility: CLINIC | Age: 67
End: 2022-10-18
Payer: MEDICARE

## 2022-10-18 VITALS
DIASTOLIC BLOOD PRESSURE: 72 MMHG | BODY MASS INDEX: 33.46 KG/M2 | TEMPERATURE: 98 F | SYSTOLIC BLOOD PRESSURE: 126 MMHG | HEIGHT: 64 IN | HEART RATE: 78 BPM | WEIGHT: 196 LBS

## 2022-10-18 DIAGNOSIS — E55.9 VITAMIN D DEFICIENCY: ICD-10-CM

## 2022-10-18 DIAGNOSIS — Z00.00 ENCOUNTER FOR ANNUAL HEALTH EXAMINATION: Primary | ICD-10-CM

## 2022-10-18 DIAGNOSIS — M25.552 BILATERAL HIP PAIN: ICD-10-CM

## 2022-10-18 DIAGNOSIS — F32.5 MAJOR DEPRESSIVE DISORDER WITH SINGLE EPISODE, IN FULL REMISSION (HCC): ICD-10-CM

## 2022-10-18 DIAGNOSIS — R53.83 OTHER FATIGUE: ICD-10-CM

## 2022-10-18 DIAGNOSIS — M25.50 ARTHRALGIA, UNSPECIFIED JOINT: ICD-10-CM

## 2022-10-18 DIAGNOSIS — Z13.820 SCREENING FOR OSTEOPOROSIS: ICD-10-CM

## 2022-10-18 DIAGNOSIS — Z83.79 FAMILY HISTORY OF CELIAC DISEASE: ICD-10-CM

## 2022-10-18 DIAGNOSIS — F98.8 ATTENTION DEFICIT DISORDER (ADD) IN ADULT: ICD-10-CM

## 2022-10-18 DIAGNOSIS — R03.0 ELEVATED BLOOD PRESSURE READING: ICD-10-CM

## 2022-10-18 DIAGNOSIS — Z12.31 ENCOUNTER FOR SCREENING MAMMOGRAM FOR HIGH-RISK PATIENT: ICD-10-CM

## 2022-10-18 DIAGNOSIS — Z78.0 ASYMPTOMATIC MENOPAUSE: ICD-10-CM

## 2022-10-18 DIAGNOSIS — Z12.11 COLON CANCER SCREENING: ICD-10-CM

## 2022-10-18 DIAGNOSIS — R73.9 HYPERGLYCEMIA: ICD-10-CM

## 2022-10-18 DIAGNOSIS — F41.9 ANXIETY: ICD-10-CM

## 2022-10-18 DIAGNOSIS — M72.0 DUPUYTREN'S CONTRACTURE: ICD-10-CM

## 2022-10-18 DIAGNOSIS — E78.00 ELEVATED CHOLESTEROL: ICD-10-CM

## 2022-10-18 DIAGNOSIS — M25.561 PAIN IN BOTH KNEES, UNSPECIFIED CHRONICITY: ICD-10-CM

## 2022-10-18 DIAGNOSIS — M25.551 BILATERAL HIP PAIN: ICD-10-CM

## 2022-10-18 DIAGNOSIS — M25.562 PAIN IN BOTH KNEES, UNSPECIFIED CHRONICITY: ICD-10-CM

## 2022-10-18 DIAGNOSIS — R74.8 ABNORMAL LIVER ENZYMES: ICD-10-CM

## 2022-10-18 RX ORDER — METFORMIN HYDROCHLORIDE 500 MG/1
500 TABLET, EXTENDED RELEASE ORAL DAILY
COMMUNITY
Start: 2022-09-30

## 2022-10-25 ENCOUNTER — LAB ENCOUNTER (OUTPATIENT)
Dept: LAB | Age: 67
End: 2022-10-25
Attending: FAMILY MEDICINE
Payer: MEDICARE

## 2022-10-25 ENCOUNTER — HOSPITAL ENCOUNTER (OUTPATIENT)
Dept: GENERAL RADIOLOGY | Age: 67
Discharge: HOME | End: 2022-10-25
Attending: FAMILY MEDICINE
Payer: MEDICARE

## 2022-10-25 DIAGNOSIS — E78.00 ELEVATED CHOLESTEROL: ICD-10-CM

## 2022-10-25 DIAGNOSIS — M25.551 BILATERAL HIP PAIN: ICD-10-CM

## 2022-10-25 DIAGNOSIS — R73.9 HYPERGLYCEMIA: ICD-10-CM

## 2022-10-25 DIAGNOSIS — R53.83 OTHER FATIGUE: ICD-10-CM

## 2022-10-25 DIAGNOSIS — M25.50 ARTHRALGIA, UNSPECIFIED JOINT: Primary | ICD-10-CM

## 2022-10-25 DIAGNOSIS — E55.9 VITAMIN D DEFICIENCY: ICD-10-CM

## 2022-10-25 DIAGNOSIS — Z83.79 FAMILY HISTORY OF CELIAC DISEASE: ICD-10-CM

## 2022-10-25 DIAGNOSIS — M25.552 BILATERAL HIP PAIN: ICD-10-CM

## 2022-10-25 LAB
ALBUMIN SERPL-MCNC: 3.7 G/DL (ref 3.4–5)
ALBUMIN/GLOB SERPL: 1.1 {RATIO} (ref 1–2)
ALP LIVER SERPL-CCNC: 111 U/L
ALT SERPL-CCNC: 46 U/L
ANION GAP SERPL CALC-SCNC: 6 MMOL/L (ref 0–18)
AST SERPL-CCNC: 38 U/L (ref 15–37)
BASOPHILS # BLD AUTO: 0.11 X10(3) UL (ref 0–0.2)
BASOPHILS NFR BLD AUTO: 1.5 %
BILIRUB SERPL-MCNC: 1 MG/DL (ref 0.1–2)
BUN BLD-MCNC: 9 MG/DL (ref 7–18)
BUN/CREAT SERPL: 12.7 (ref 10–20)
CALCIUM BLD-MCNC: 9.3 MG/DL (ref 8.5–10.1)
CHLORIDE SERPL-SCNC: 108 MMOL/L (ref 98–112)
CHOLEST SERPL-MCNC: 203 MG/DL (ref ?–200)
CO2 SERPL-SCNC: 26 MMOL/L (ref 21–32)
CREAT BLD-MCNC: 0.71 MG/DL
CRP SERPL-MCNC: 0.46 MG/DL (ref ?–0.3)
DEPRECATED RDW RBC AUTO: 50.2 FL (ref 35.1–46.3)
EOSINOPHIL # BLD AUTO: 0.45 X10(3) UL (ref 0–0.7)
EOSINOPHIL NFR BLD AUTO: 5.9 %
ERYTHROCYTE [DISTWIDTH] IN BLOOD BY AUTOMATED COUNT: 13.2 % (ref 11–15)
ERYTHROCYTE [SEDIMENTATION RATE] IN BLOOD: 23 MM/HR
EST. AVERAGE GLUCOSE BLD GHB EST-MCNC: 111 MG/DL (ref 68–126)
FASTING PATIENT LIPID ANSWER: YES
FASTING STATUS PATIENT QL REPORTED: YES
GFR SERPLBLD BASED ON 1.73 SQ M-ARVRAT: 93 ML/MIN/1.73M2 (ref 60–?)
GLOBULIN PLAS-MCNC: 3.3 G/DL (ref 2.8–4.4)
GLUCOSE BLD-MCNC: 114 MG/DL (ref 70–99)
HBA1C MFR BLD: 5.5 % (ref ?–5.7)
HCT VFR BLD AUTO: 49.2 %
HDLC SERPL-MCNC: 50 MG/DL (ref 40–59)
HGB BLD-MCNC: 16.2 G/DL
IGA SERPL-MCNC: 295 MG/DL (ref 70–312)
IMM GRANULOCYTES # BLD AUTO: 0.03 X10(3) UL (ref 0–1)
IMM GRANULOCYTES NFR BLD: 0.4 %
LDLC SERPL CALC-MCNC: 110 MG/DL (ref ?–100)
LYMPHOCYTES # BLD AUTO: 2.49 X10(3) UL (ref 1–4)
LYMPHOCYTES NFR BLD AUTO: 32.8 %
MCH RBC QN AUTO: 33 PG (ref 26–34)
MCHC RBC AUTO-ENTMCNC: 32.9 G/DL (ref 31–37)
MCV RBC AUTO: 100.2 FL
MONOCYTES # BLD AUTO: 0.54 X10(3) UL (ref 0.1–1)
MONOCYTES NFR BLD AUTO: 7.1 %
NEUTROPHILS # BLD AUTO: 3.96 X10 (3) UL (ref 1.5–7.7)
NEUTROPHILS # BLD AUTO: 3.96 X10(3) UL (ref 1.5–7.7)
NEUTROPHILS NFR BLD AUTO: 52.3 %
NONHDLC SERPL-MCNC: 153 MG/DL (ref ?–130)
OSMOLALITY SERPL CALC.SUM OF ELEC: 290 MOSM/KG (ref 275–295)
PLATELET # BLD AUTO: 216 10(3)UL (ref 150–450)
POTASSIUM SERPL-SCNC: 4.4 MMOL/L (ref 3.5–5.1)
PROT SERPL-MCNC: 7 G/DL (ref 6.4–8.2)
RBC # BLD AUTO: 4.91 X10(6)UL
RHEUMATOID FACT SERPL-ACNC: <10 IU/ML (ref ?–15)
SODIUM SERPL-SCNC: 140 MMOL/L (ref 136–145)
T4 FREE SERPL-MCNC: 1.1 NG/DL (ref 0.8–1.7)
TRIGL SERPL-MCNC: 247 MG/DL (ref 30–149)
TSI SER-ACNC: 2.07 MIU/ML (ref 0.36–3.74)
VIT B12 SERPL-MCNC: 608 PG/ML (ref 193–986)
VIT D+METAB SERPL-MCNC: 94.4 NG/ML (ref 30–100)
VLDLC SERPL CALC-MCNC: 43 MG/DL (ref 0–30)
WBC # BLD AUTO: 7.6 X10(3) UL (ref 4–11)

## 2022-10-25 PROCEDURE — 86225 DNA ANTIBODY NATIVE: CPT

## 2022-10-25 PROCEDURE — 83036 HEMOGLOBIN GLYCOSYLATED A1C: CPT

## 2022-10-25 PROCEDURE — 80061 LIPID PANEL: CPT

## 2022-10-25 PROCEDURE — 86431 RHEUMATOID FACTOR QUANT: CPT

## 2022-10-25 PROCEDURE — 82306 VITAMIN D 25 HYDROXY: CPT

## 2022-10-25 PROCEDURE — 73523 X-RAY EXAM HIPS BI 5/> VIEWS: CPT | Performed by: FAMILY MEDICINE

## 2022-10-25 PROCEDURE — 82607 VITAMIN B-12: CPT

## 2022-10-25 PROCEDURE — 85652 RBC SED RATE AUTOMATED: CPT

## 2022-10-25 PROCEDURE — 36415 COLL VENOUS BLD VENIPUNCTURE: CPT

## 2022-10-25 PROCEDURE — 86140 C-REACTIVE PROTEIN: CPT

## 2022-10-25 PROCEDURE — 85025 COMPLETE CBC W/AUTO DIFF WBC: CPT

## 2022-10-25 PROCEDURE — 81382 HLA II TYPING 1 LOC HR: CPT

## 2022-10-25 PROCEDURE — 84439 ASSAY OF FREE THYROXINE: CPT

## 2022-10-25 PROCEDURE — 82784 ASSAY IGA/IGD/IGG/IGM EACH: CPT

## 2022-10-25 PROCEDURE — 80053 COMPREHEN METABOLIC PANEL: CPT

## 2022-10-25 PROCEDURE — 86038 ANTINUCLEAR ANTIBODIES: CPT

## 2022-10-25 PROCEDURE — 86364 TISS TRNSGLTMNASE EA IG CLAS: CPT

## 2022-10-25 PROCEDURE — 84443 ASSAY THYROID STIM HORMONE: CPT

## 2022-10-26 DIAGNOSIS — M16.0 ARTHRITIS OF BOTH HIPS: ICD-10-CM

## 2022-10-26 DIAGNOSIS — M25.551 BILATERAL HIP PAIN: Primary | ICD-10-CM

## 2022-10-26 DIAGNOSIS — M25.552 BILATERAL HIP PAIN: Primary | ICD-10-CM

## 2022-10-27 DIAGNOSIS — D58.2 ELEVATED HEMOGLOBIN (HCC): Primary | ICD-10-CM

## 2022-10-29 LAB
DSDNA IGG SERPL IA-ACNC: 1.4 IU/ML
ENA AB SER QL IA: 0.2 UG/L
ENA AB SER QL IA: NEGATIVE
TTG IGA SER-ACNC: 0.9 U/ML (ref ?–7)

## 2022-11-21 ENCOUNTER — HOSPITAL ENCOUNTER (OUTPATIENT)
Dept: MAMMOGRAPHY | Age: 67
Discharge: HOME | End: 2022-11-21
Attending: FAMILY MEDICINE
Payer: MEDICARE

## 2022-11-21 DIAGNOSIS — Z12.31 ENCOUNTER FOR SCREENING MAMMOGRAM FOR HIGH-RISK PATIENT: ICD-10-CM

## 2022-11-21 PROCEDURE — 77063 BREAST TOMOSYNTHESIS BI: CPT | Performed by: FAMILY MEDICINE

## 2022-11-21 PROCEDURE — 77067 SCR MAMMO BI INCL CAD: CPT | Performed by: FAMILY MEDICINE

## 2022-11-29 ENCOUNTER — HOSPITAL ENCOUNTER (OUTPATIENT)
Dept: CT IMAGING | Age: 67
Discharge: HOME | End: 2022-11-29
Attending: FAMILY MEDICINE

## 2022-11-29 DIAGNOSIS — Z13.6 ENCOUNTER FOR SCREENING FOR CARDIOVASCULAR DISORDERS: ICD-10-CM

## 2022-12-17 DIAGNOSIS — I10 HYPERTENSION, UNSPECIFIED TYPE: ICD-10-CM

## 2022-12-19 RX ORDER — LOSARTAN POTASSIUM 25 MG/1
25 TABLET ORAL DAILY
Qty: 90 TABLET | Refills: 1 | Status: SHIPPED | OUTPATIENT
Start: 2022-12-19

## 2022-12-29 ENCOUNTER — TELEPHONE (OUTPATIENT)
Dept: FAMILY MEDICINE CLINIC | Facility: CLINIC | Age: 67
End: 2022-12-29

## 2023-01-03 ENCOUNTER — OFFICE VISIT (OUTPATIENT)
Dept: HEMATOLOGY/ONCOLOGY | Facility: HOSPITAL | Age: 68
End: 2023-01-03
Attending: INTERNAL MEDICINE
Payer: MEDICARE

## 2023-01-03 VITALS
HEART RATE: 99 BPM | OXYGEN SATURATION: 97 % | RESPIRATION RATE: 18 BRPM | SYSTOLIC BLOOD PRESSURE: 163 MMHG | DIASTOLIC BLOOD PRESSURE: 87 MMHG | TEMPERATURE: 98 F | BODY MASS INDEX: 34 KG/M2 | WEIGHT: 197 LBS

## 2023-01-03 DIAGNOSIS — D75.1 POLYCYTHEMIA: Primary | ICD-10-CM

## 2023-01-03 DIAGNOSIS — D75.89 MACROCYTOSIS: ICD-10-CM

## 2023-01-03 LAB
BASOPHILS # BLD AUTO: 0.09 X10(3) UL (ref 0–0.2)
BASOPHILS NFR BLD AUTO: 1.6 %
DEPRECATED HBV CORE AB SER IA-ACNC: 267.9 NG/ML
EOSINOPHIL # BLD AUTO: 0.25 X10(3) UL (ref 0–0.7)
EOSINOPHIL NFR BLD AUTO: 4.4 %
ERYTHROCYTE [DISTWIDTH] IN BLOOD BY AUTOMATED COUNT: 13.9 %
FOLATE SERPL-MCNC: 3.4 NG/ML (ref 8.7–?)
HCT VFR BLD AUTO: 50 %
HGB BLD-MCNC: 17.2 G/DL
IMM GRANULOCYTES # BLD AUTO: 0.02 X10(3) UL (ref 0–1)
IMM GRANULOCYTES NFR BLD: 0.4 %
IRON SATN MFR SERPL: 39 %
IRON SERPL-MCNC: 170 UG/DL
LDH SERPL L TO P-CCNC: 170 U/L
LYMPHOCYTES # BLD AUTO: 1.91 X10(3) UL (ref 1–4)
LYMPHOCYTES NFR BLD AUTO: 33.9 %
MCH RBC QN AUTO: 33.5 PG (ref 26–34)
MCHC RBC AUTO-ENTMCNC: 34.4 G/DL (ref 31–37)
MCV RBC AUTO: 97.5 FL
MONOCYTES # BLD AUTO: 0.53 X10(3) UL (ref 0.1–1)
MONOCYTES NFR BLD AUTO: 9.4 %
NEUTROPHILS # BLD AUTO: 2.83 X10 (3) UL (ref 1.5–7.7)
NEUTROPHILS # BLD AUTO: 2.83 X10(3) UL (ref 1.5–7.7)
NEUTROPHILS NFR BLD AUTO: 50.3 %
PLATELET # BLD AUTO: 170 10(3)UL (ref 150–450)
RBC # BLD AUTO: 5.13 X10(6)UL
TIBC SERPL-MCNC: 441 UG/DL (ref 240–450)
TRANSFERRIN SERPL-MCNC: 296 MG/DL (ref 200–360)
URATE SERPL-MCNC: 5.7 MG/DL
VIT B12 SERPL-MCNC: 567 PG/ML (ref 193–986)
WBC # BLD AUTO: 5.6 X10(3) UL (ref 4–11)

## 2023-01-03 PROCEDURE — 99204 OFFICE O/P NEW MOD 45 MIN: CPT | Performed by: INTERNAL MEDICINE

## 2023-01-03 RX ORDER — FOLIC ACID 1 MG/1
1 TABLET ORAL DAILY
Qty: 30 TABLET | Refills: 6 | Status: SHIPPED | OUTPATIENT
Start: 2023-01-03 | End: 2024-01-03

## 2023-01-03 RX ORDER — IBUPROFEN 200 MG
200 TABLET ORAL EVERY 6 HOURS PRN
COMMUNITY

## 2023-01-03 RX ORDER — LISDEXAMFETAMINE DIMESYLATE 60 MG/1
60 CAPSULE ORAL EVERY MORNING
COMMUNITY
Start: 2022-11-19

## 2023-01-03 RX ORDER — BREXPIPRAZOLE 4 MG/1
1 TABLET ORAL DAILY
COMMUNITY
Start: 2022-12-08

## 2023-01-03 NOTE — PROGRESS NOTES
Education Record    Learner:  Patient    Disease / Shilpa Height blood work      Barriers / Limitations:  None   Comments:    Method:  Discussion   Comments:    General Topics:  Plan of care reviewed   Comments:    Outcome:  Shows understanding   Comments:    Feeling well today. Has a lot of regular pain. Not in pain today. Has started work up with her PCP for this pain.

## 2023-01-04 ENCOUNTER — TELEPHONE (OUTPATIENT)
Dept: HEMATOLOGY/ONCOLOGY | Facility: HOSPITAL | Age: 68
End: 2023-01-04

## 2023-01-04 LAB — ERYTHROPOIETIN (EPO): 6 MU/ML

## 2023-01-04 NOTE — TELEPHONE ENCOUNTER
Sola Lewis MD  [facility]  Please call, other labs are pending but folic acid low, Rx sent. Should eat more greens    [facility] verbal release reviewed    LVM to review the above. Let her know we will contact her when all the labs are back as well. My chart message sent as well.

## 2023-01-07 LAB
C282Y HEMOCHROMATOSIS MUTATION: NEGATIVE
S65C HEMOCHROMATOSIS MUTATION: NEGATIVE

## 2023-01-12 LAB — JAK2 GENE, V617F MUTATION, QUALITATIVE: NOT DETECTED

## 2023-01-17 ENCOUNTER — OFFICE VISIT (OUTPATIENT)
Dept: HEMATOLOGY/ONCOLOGY | Facility: HOSPITAL | Age: 68
End: 2023-01-17
Attending: INTERNAL MEDICINE
Payer: MEDICARE

## 2023-01-17 VITALS
SYSTOLIC BLOOD PRESSURE: 138 MMHG | BODY MASS INDEX: 34 KG/M2 | RESPIRATION RATE: 18 BRPM | OXYGEN SATURATION: 95 % | TEMPERATURE: 97 F | DIASTOLIC BLOOD PRESSURE: 83 MMHG | HEART RATE: 79 BPM | WEIGHT: 198.5 LBS

## 2023-01-17 DIAGNOSIS — E83.110 HEREDITARY HEMOCHROMATOSIS (HCC): Primary | ICD-10-CM

## 2023-01-17 PROCEDURE — 99214 OFFICE O/P EST MOD 30 MIN: CPT | Performed by: INTERNAL MEDICINE

## 2023-01-17 RX ORDER — ASPIRIN 81 MG/1
81 TABLET ORAL DAILY
COMMUNITY

## 2023-01-17 NOTE — PROGRESS NOTES
Education Record    Learner:  Patient    Disease / Diagnosis:hemachromatosis    Barriers / Limitations:  None   Comments:    Method:  Discussion   Comments:    General Topics:  Plan of care reviewed   Comments:    Outcome:  Shows understanding   Comments:  Started folic acid and baby asa. Here to review recent results.

## 2023-02-06 ENCOUNTER — PATIENT OUTREACH (OUTPATIENT)
Dept: CASE MANAGEMENT | Age: 68
End: 2023-02-06

## 2023-02-06 NOTE — PROCEDURES
The office order for Diabetes Registry request is Approved and finalized on February 6, 2023.     Thanks,  [facility] [employer]

## 2023-03-22 ENCOUNTER — APPOINTMENT (OUTPATIENT)
Dept: HEMATOLOGY/ONCOLOGY | Facility: HOSPITAL | Age: 68
End: 2023-03-22
Attending: INTERNAL MEDICINE
Payer: MEDICARE

## 2023-03-24 ENCOUNTER — OFFICE VISIT (OUTPATIENT)
Dept: HEMATOLOGY/ONCOLOGY | Facility: HOSPITAL | Age: 68
End: 2023-03-24
Attending: INTERNAL MEDICINE
Payer: MEDICARE

## 2023-03-24 VITALS
WEIGHT: 193.63 LBS | DIASTOLIC BLOOD PRESSURE: 80 MMHG | TEMPERATURE: 98 F | SYSTOLIC BLOOD PRESSURE: 139 MMHG | OXYGEN SATURATION: 97 % | BODY MASS INDEX: 33 KG/M2 | RESPIRATION RATE: 20 BRPM | HEART RATE: 74 BPM

## 2023-03-24 DIAGNOSIS — E53.8 FOLIC ACID DEFICIENCY: ICD-10-CM

## 2023-03-24 DIAGNOSIS — E83.110 HEREDITARY HEMOCHROMATOSIS (HCC): ICD-10-CM

## 2023-03-24 LAB
AFP-TM SERPL-MCNC: 5.3 NG/ML (ref ?–8)
ALBUMIN SERPL-MCNC: 3.6 G/DL (ref 3.4–5)
ALBUMIN/GLOB SERPL: 1 {RATIO} (ref 1–2)
ALP LIVER SERPL-CCNC: 89 U/L
ALT SERPL-CCNC: 36 U/L
ANION GAP SERPL CALC-SCNC: 8 MMOL/L (ref 0–18)
AST SERPL-CCNC: 22 U/L (ref 15–37)
BASOPHILS # BLD AUTO: 0.09 X10(3) UL (ref 0–0.2)
BASOPHILS NFR BLD AUTO: 1.3 %
BILIRUB SERPL-MCNC: 1 MG/DL (ref 0.1–2)
BUN BLD-MCNC: 8 MG/DL (ref 7–18)
CALCIUM BLD-MCNC: 9 MG/DL (ref 8.5–10.1)
CHLORIDE SERPL-SCNC: 107 MMOL/L (ref 98–112)
CO2 SERPL-SCNC: 24 MMOL/L (ref 21–32)
CREAT BLD-MCNC: 0.86 MG/DL
DEPRECATED HBV CORE AB SER IA-ACNC: 173.8 NG/ML
EOSINOPHIL # BLD AUTO: 0.5 X10(3) UL (ref 0–0.7)
EOSINOPHIL NFR BLD AUTO: 7.1 %
ERYTHROCYTE [DISTWIDTH] IN BLOOD BY AUTOMATED COUNT: 12.7 %
EST. AVERAGE GLUCOSE BLD GHB EST-MCNC: 108 MG/DL (ref 68–126)
FOLATE SERPL-MCNC: 19.9 NG/ML (ref 8.7–?)
GFR SERPLBLD BASED ON 1.73 SQ M-ARVRAT: 74 ML/MIN/1.73M2 (ref 60–?)
GLOBULIN PLAS-MCNC: 3.7 G/DL (ref 2.8–4.4)
GLUCOSE BLD-MCNC: 151 MG/DL (ref 70–99)
HBA1C MFR BLD: 5.4 % (ref ?–5.7)
HCT VFR BLD AUTO: 42.4 %
HGB BLD-MCNC: 14.1 G/DL
IMM GRANULOCYTES # BLD AUTO: 0.03 X10(3) UL (ref 0–1)
IMM GRANULOCYTES NFR BLD: 0.4 %
IRON SATN MFR SERPL: 25 %
IRON SERPL-MCNC: 100 UG/DL
LYMPHOCYTES # BLD AUTO: 2.42 X10(3) UL (ref 1–4)
LYMPHOCYTES NFR BLD AUTO: 34.4 %
MCH RBC QN AUTO: 31.8 PG (ref 26–34)
MCHC RBC AUTO-ENTMCNC: 33.3 G/DL (ref 31–37)
MCV RBC AUTO: 95.7 FL
MONOCYTES # BLD AUTO: 0.33 X10(3) UL (ref 0.1–1)
MONOCYTES NFR BLD AUTO: 4.7 %
NEUTROPHILS # BLD AUTO: 3.66 X10 (3) UL (ref 1.5–7.7)
NEUTROPHILS # BLD AUTO: 3.66 X10(3) UL (ref 1.5–7.7)
NEUTROPHILS NFR BLD AUTO: 52.1 %
OSMOLALITY SERPL CALC.SUM OF ELEC: 289 MOSM/KG (ref 275–295)
PLATELET # BLD AUTO: 223 10(3)UL (ref 150–450)
POTASSIUM SERPL-SCNC: 3.9 MMOL/L (ref 3.5–5.1)
PROT SERPL-MCNC: 7.3 G/DL (ref 6.4–8.2)
RBC # BLD AUTO: 4.43 X10(6)UL
SODIUM SERPL-SCNC: 139 MMOL/L (ref 136–145)
TIBC SERPL-MCNC: 407 UG/DL (ref 240–450)
TRANSFERRIN SERPL-MCNC: 273 MG/DL (ref 200–360)
VIT D+METAB SERPL-MCNC: 51.4 NG/ML (ref 30–100)
WBC # BLD AUTO: 7 X10(3) UL (ref 4–11)

## 2023-03-24 PROCEDURE — 99214 OFFICE O/P EST MOD 30 MIN: CPT | Performed by: INTERNAL MEDICINE

## 2023-03-24 NOTE — PROGRESS NOTES
Here for MD fu visit for hemochromatosis  Had phlebotomy this past Tuesday  Dizziness w/ position changes  No other complaints  Labs drawn  Education Record    Learner:  Patient    Disease / Diagnosis:  Barriers / Limitations:  None   Comments:    Method:  Brief focused  Comments:    General Topics:  Plan of care reviewed   Comments:    Outcome:  Shows understanding   Comments:

## 2023-05-10 ENCOUNTER — OFFICE VISIT (OUTPATIENT)
Dept: FAMILY MEDICINE CLINIC | Facility: CLINIC | Age: 68
End: 2023-05-10
Payer: MEDICARE

## 2023-05-10 ENCOUNTER — HOSPITAL ENCOUNTER (OUTPATIENT)
Dept: BONE DENSITY | Age: 68
Discharge: HOME | End: 2023-05-10
Attending: FAMILY MEDICINE
Payer: MEDICARE

## 2023-05-10 VITALS
RESPIRATION RATE: 16 BRPM | BODY MASS INDEX: 32.04 KG/M2 | SYSTOLIC BLOOD PRESSURE: 114 MMHG | WEIGHT: 190 LBS | OXYGEN SATURATION: 96 % | HEIGHT: 64.5 IN | DIASTOLIC BLOOD PRESSURE: 74 MMHG | HEART RATE: 80 BPM

## 2023-05-10 DIAGNOSIS — E78.00 ELEVATED CHOLESTEROL: ICD-10-CM

## 2023-05-10 DIAGNOSIS — R73.9 HYPERGLYCEMIA: ICD-10-CM

## 2023-05-10 DIAGNOSIS — F98.8 ATTENTION DEFICIT DISORDER (ADD) IN ADULT: ICD-10-CM

## 2023-05-10 DIAGNOSIS — M72.0 DUPUYTREN'S CONTRACTURE: ICD-10-CM

## 2023-05-10 DIAGNOSIS — Z13.820 SCREENING FOR OSTEOPOROSIS: ICD-10-CM

## 2023-05-10 DIAGNOSIS — E11.22 TYPE 2 DM WITH CKD STAGE 3 AND HYPERTENSION (HCC): ICD-10-CM

## 2023-05-10 DIAGNOSIS — R53.83 OTHER FATIGUE: ICD-10-CM

## 2023-05-10 DIAGNOSIS — I12.9 TYPE 2 DM WITH CKD STAGE 3 AND HYPERTENSION (HCC): ICD-10-CM

## 2023-05-10 DIAGNOSIS — Z12.31 ENCOUNTER FOR SCREENING MAMMOGRAM FOR HIGH-RISK PATIENT: ICD-10-CM

## 2023-05-10 DIAGNOSIS — J41.0 SMOKERS' COUGH (HCC): Chronic | ICD-10-CM

## 2023-05-10 DIAGNOSIS — E55.9 VITAMIN D DEFICIENCY: ICD-10-CM

## 2023-05-10 DIAGNOSIS — R06.83 SNORING: ICD-10-CM

## 2023-05-10 DIAGNOSIS — E83.110 HEREDITARY HEMOCHROMATOSIS (HCC): ICD-10-CM

## 2023-05-10 DIAGNOSIS — Z78.0 ASYMPTOMATIC MENOPAUSE: ICD-10-CM

## 2023-05-10 DIAGNOSIS — Z12.2 SCREENING FOR LUNG CANCER: ICD-10-CM

## 2023-05-10 DIAGNOSIS — L85.3 DRY SKIN DERMATITIS: ICD-10-CM

## 2023-05-10 DIAGNOSIS — J34.89 SINUS PAIN: ICD-10-CM

## 2023-05-10 DIAGNOSIS — F41.9 ANXIETY: ICD-10-CM

## 2023-05-10 DIAGNOSIS — I10 HYPERTENSION, UNSPECIFIED TYPE: ICD-10-CM

## 2023-05-10 DIAGNOSIS — D58.2 ELEVATED HEMOGLOBIN (HCC): ICD-10-CM

## 2023-05-10 DIAGNOSIS — F32.5 MAJOR DEPRESSIVE DISORDER WITH SINGLE EPISODE, IN FULL REMISSION (HCC): ICD-10-CM

## 2023-05-10 DIAGNOSIS — N18.30 TYPE 2 DM WITH CKD STAGE 3 AND HYPERTENSION (HCC): ICD-10-CM

## 2023-05-10 DIAGNOSIS — E53.8 VITAMIN B12 DEFICIENCY: ICD-10-CM

## 2023-05-10 DIAGNOSIS — Z00.00 ENCOUNTER FOR ANNUAL HEALTH EXAMINATION: Primary | ICD-10-CM

## 2023-05-10 PROBLEM — R03.0 ELEVATED BLOOD PRESSURE READING: Status: RESOLVED | Noted: 2021-11-16 | Resolved: 2023-05-10

## 2023-05-10 PROCEDURE — 77080 DXA BONE DENSITY AXIAL: CPT | Performed by: FAMILY MEDICINE

## 2023-05-10 RX ORDER — AMOXICILLIN AND CLAVULANATE POTASSIUM 875; 125 MG/1; MG/1
1 TABLET, FILM COATED ORAL 2 TIMES DAILY
Qty: 20 TABLET | Refills: 0 | Status: SHIPPED | OUTPATIENT
Start: 2023-05-10 | End: 2023-05-20

## 2023-05-10 RX ORDER — LOSARTAN POTASSIUM 25 MG/1
25 TABLET ORAL DAILY
Qty: 90 TABLET | Refills: 1 | Status: SHIPPED | OUTPATIENT
Start: 2023-05-10

## 2023-05-15 ENCOUNTER — TELEPHONE (OUTPATIENT)
Dept: FAMILY MEDICINE CLINIC | Facility: CLINIC | Age: 68
End: 2023-05-15

## 2023-05-15 RX ORDER — FLUCONAZOLE 150 MG/1
150 TABLET ORAL ONCE
Qty: 1 TABLET | Refills: 0 | Status: SHIPPED | OUTPATIENT
Start: 2023-05-15 | End: 2023-05-15

## 2023-05-15 NOTE — TELEPHONE ENCOUNTER
Pt states she has liquid diarrhea and yeast infection since starting Augmentin. She has taking 5 days. Pt states she feels better regarding sinuses. Do you want to rx another abx? Okay for diflucan?

## 2023-05-24 PROBLEM — R73.9 HYPERGLYCEMIA: Status: RESOLVED | Noted: 2021-11-16 | Resolved: 2023-05-24

## 2023-08-09 ENCOUNTER — OFFICE VISIT (OUTPATIENT)
Dept: HEMATOLOGY/ONCOLOGY | Facility: HOSPITAL | Age: 68
End: 2023-08-09
Attending: INTERNAL MEDICINE
Payer: MEDICARE

## 2023-08-09 VITALS
OXYGEN SATURATION: 95 % | RESPIRATION RATE: 16 BRPM | DIASTOLIC BLOOD PRESSURE: 78 MMHG | WEIGHT: 191.63 LBS | SYSTOLIC BLOOD PRESSURE: 127 MMHG | TEMPERATURE: 98 F | BODY MASS INDEX: 32 KG/M2 | HEART RATE: 68 BPM

## 2023-08-09 DIAGNOSIS — E83.110 HEREDITARY HEMOCHROMATOSIS (HCC): ICD-10-CM

## 2023-08-09 DIAGNOSIS — E53.8 FOLIC ACID DEFICIENCY: ICD-10-CM

## 2023-08-09 DIAGNOSIS — E53.8 VITAMIN B12 DEFICIENCY: ICD-10-CM

## 2023-08-09 DIAGNOSIS — E11.22 TYPE 2 DM WITH CKD STAGE 3 AND HYPERTENSION (HCC): ICD-10-CM

## 2023-08-09 DIAGNOSIS — N18.30 TYPE 2 DM WITH CKD STAGE 3 AND HYPERTENSION (HCC): ICD-10-CM

## 2023-08-09 DIAGNOSIS — E55.9 VITAMIN D DEFICIENCY: ICD-10-CM

## 2023-08-09 DIAGNOSIS — I12.9 TYPE 2 DM WITH CKD STAGE 3 AND HYPERTENSION (HCC): ICD-10-CM

## 2023-08-09 DIAGNOSIS — I10 HYPERTENSION, UNSPECIFIED TYPE: ICD-10-CM

## 2023-08-09 DIAGNOSIS — E78.00 ELEVATED CHOLESTEROL: ICD-10-CM

## 2023-08-09 LAB
ALBUMIN SERPL-MCNC: 3.6 G/DL (ref 3.4–5)
ALBUMIN/GLOB SERPL: 0.9 {RATIO} (ref 1–2)
ALP LIVER SERPL-CCNC: 107 U/L
ALT SERPL-CCNC: 54 U/L
ANION GAP SERPL CALC-SCNC: 4 MMOL/L (ref 0–18)
AST SERPL-CCNC: 45 U/L (ref 15–37)
BASOPHILS # BLD AUTO: 0.09 X10(3) UL (ref 0–0.2)
BASOPHILS NFR BLD AUTO: 1.2 %
BILIRUB SERPL-MCNC: 1 MG/DL (ref 0.1–2)
BUN BLD-MCNC: 9 MG/DL (ref 7–18)
CALCIUM BLD-MCNC: 9.9 MG/DL (ref 8.5–10.1)
CHLORIDE SERPL-SCNC: 106 MMOL/L (ref 98–112)
CHOLEST SERPL-MCNC: 189 MG/DL (ref ?–200)
CO2 SERPL-SCNC: 26 MMOL/L (ref 21–32)
CREAT BLD-MCNC: 0.85 MG/DL
CREAT UR-SCNC: 191 MG/DL
DEPRECATED HBV CORE AB SER IA-ACNC: 14.5 NG/ML
EGFRCR SERPLBLD CKD-EPI 2021: 75 ML/MIN/1.73M2 (ref 60–?)
EOSINOPHIL # BLD AUTO: 0.45 X10(3) UL (ref 0–0.7)
EOSINOPHIL NFR BLD AUTO: 5.9 %
ERYTHROCYTE [DISTWIDTH] IN BLOOD BY AUTOMATED COUNT: 13.4 %
EST. AVERAGE GLUCOSE BLD GHB EST-MCNC: 117 MG/DL (ref 68–126)
FASTING PATIENT LIPID ANSWER: YES
FASTING STATUS PATIENT QL REPORTED: YES
GLOBULIN PLAS-MCNC: 3.9 G/DL (ref 2.8–4.4)
GLUCOSE BLD-MCNC: 110 MG/DL (ref 70–99)
HBA1C MFR BLD: 5.7 % (ref ?–5.7)
HCT VFR BLD AUTO: 46.7 %
HDLC SERPL-MCNC: 58 MG/DL (ref 40–59)
HGB BLD-MCNC: 15.6 G/DL
IMM GRANULOCYTES # BLD AUTO: 0.03 X10(3) UL (ref 0–1)
IMM GRANULOCYTES NFR BLD: 0.4 %
IRON SATN MFR SERPL: 13 %
IRON SERPL-MCNC: 71 UG/DL
LDLC SERPL CALC-MCNC: 108 MG/DL (ref ?–100)
LYMPHOCYTES # BLD AUTO: 2.86 X10(3) UL (ref 1–4)
LYMPHOCYTES NFR BLD AUTO: 37.2 %
MCH RBC QN AUTO: 32.1 PG (ref 26–34)
MCHC RBC AUTO-ENTMCNC: 33.4 G/DL (ref 31–37)
MCV RBC AUTO: 96.1 FL
MICROALBUMIN UR-MCNC: 0.86 MG/DL
MICROALBUMIN/CREAT 24H UR-RTO: 4.5 UG/MG (ref ?–30)
MONOCYTES # BLD AUTO: 0.63 X10(3) UL (ref 0.1–1)
MONOCYTES NFR BLD AUTO: 8.2 %
NEUTROPHILS # BLD AUTO: 3.62 X10 (3) UL (ref 1.5–7.7)
NEUTROPHILS # BLD AUTO: 3.62 X10(3) UL (ref 1.5–7.7)
NEUTROPHILS NFR BLD AUTO: 47.1 %
NONHDLC SERPL-MCNC: 131 MG/DL (ref ?–130)
OSMOLALITY SERPL CALC.SUM OF ELEC: 281 MOSM/KG (ref 275–295)
PLATELET # BLD AUTO: 233 10(3)UL (ref 150–450)
POTASSIUM SERPL-SCNC: 4.5 MMOL/L (ref 3.5–5.1)
PROT SERPL-MCNC: 7.5 G/DL (ref 6.4–8.2)
RBC # BLD AUTO: 4.86 X10(6)UL
SODIUM SERPL-SCNC: 136 MMOL/L (ref 136–145)
T4 FREE SERPL-MCNC: 1 NG/DL (ref 0.8–1.7)
TIBC SERPL-MCNC: 565 UG/DL (ref 240–450)
TRANSFERRIN SERPL-MCNC: 379 MG/DL (ref 200–360)
TRIGL SERPL-MCNC: 132 MG/DL (ref 30–149)
TSI SER-ACNC: 2.91 MIU/ML (ref 0.36–3.74)
VIT B12 SERPL-MCNC: >2000 PG/ML (ref 193–986)
VIT D+METAB SERPL-MCNC: 54.4 NG/ML (ref 30–100)
VLDLC SERPL CALC-MCNC: 22 MG/DL (ref 0–30)
WBC # BLD AUTO: 7.7 X10(3) UL (ref 4–11)

## 2023-08-09 PROCEDURE — 99214 OFFICE O/P EST MOD 30 MIN: CPT | Performed by: INTERNAL MEDICINE

## 2023-08-09 RX ORDER — ZOLPIDEM TARTRATE 12.5 MG/1
12.5 TABLET, FILM COATED, EXTENDED RELEASE ORAL NIGHTLY
COMMUNITY
Start: 2023-07-14

## 2023-08-09 NOTE — PROGRESS NOTES
Education Record    Learner:  Patient    Disease / Cindy 104    Barriers / Limitations:  None   Comments:    Method:  Discussion   Comments:    General Topics:  Plan of care reviewed   Comments:    Outcome:  Shows understanding   Comments:   Last phlebotomy 1 week ago, [facility]. 2 times since last visit. Feeling tired. Having trouble falling asleep.

## 2023-08-10 ENCOUNTER — TELEPHONE (OUTPATIENT)
Dept: HEMATOLOGY/ONCOLOGY | Facility: HOSPITAL | Age: 68
End: 2023-08-10

## 2023-08-10 DIAGNOSIS — R73.9 HYPERGLYCEMIA: ICD-10-CM

## 2023-08-10 DIAGNOSIS — N18.30 TYPE 2 DM WITH CKD STAGE 3 AND HYPERTENSION (HCC): Primary | ICD-10-CM

## 2023-08-10 DIAGNOSIS — E11.22 TYPE 2 DM WITH CKD STAGE 3 AND HYPERTENSION (HCC): Primary | ICD-10-CM

## 2023-08-10 DIAGNOSIS — E78.00 ELEVATED CHOLESTEROL: ICD-10-CM

## 2023-08-10 DIAGNOSIS — I12.9 TYPE 2 DM WITH CKD STAGE 3 AND HYPERTENSION (HCC): Primary | ICD-10-CM

## 2023-08-10 NOTE — TELEPHONE ENCOUNTER
[facility] verbal release reviewed     LVM to review the message below. Asked to call back with any questions          ----- Message from Reanna Lu MD sent at 8/9/2023  2:56 PM CDT -----  Please call, labs look good, iron is abit low but where we want it to be. No iron supplements.  Next phlebotomy in 3 months then labs/MD 6 months

## 2023-09-28 ENCOUNTER — OFFICE VISIT (OUTPATIENT)
Dept: FAMILY MEDICINE CLINIC | Facility: CLINIC | Age: 68
End: 2023-09-28
Payer: MEDICARE

## 2023-09-28 ENCOUNTER — EKG ENCOUNTER (OUTPATIENT)
Dept: LAB | Age: 68
End: 2023-09-28
Attending: FAMILY MEDICINE
Payer: MEDICARE

## 2023-09-28 VITALS
HEIGHT: 64.5 IN | HEART RATE: 87 BPM | BODY MASS INDEX: 32 KG/M2 | RESPIRATION RATE: 18 BRPM | DIASTOLIC BLOOD PRESSURE: 80 MMHG | OXYGEN SATURATION: 98 % | SYSTOLIC BLOOD PRESSURE: 122 MMHG

## 2023-09-28 DIAGNOSIS — Z87.891 FORMER TOBACCO USE: ICD-10-CM

## 2023-09-28 DIAGNOSIS — R06.02 SOB (SHORTNESS OF BREATH): Primary | ICD-10-CM

## 2023-09-28 DIAGNOSIS — R06.02 SOB (SHORTNESS OF BREATH): ICD-10-CM

## 2023-09-28 DIAGNOSIS — N18.30 TYPE 2 DM WITH CKD STAGE 3 AND HYPERTENSION (HCC): ICD-10-CM

## 2023-09-28 DIAGNOSIS — E11.22 TYPE 2 DM WITH CKD STAGE 3 AND HYPERTENSION (HCC): ICD-10-CM

## 2023-09-28 DIAGNOSIS — Z82.49 FAMILY HISTORY OF EARLY CAD: ICD-10-CM

## 2023-09-28 DIAGNOSIS — I12.9 TYPE 2 DM WITH CKD STAGE 3 AND HYPERTENSION (HCC): ICD-10-CM

## 2023-09-28 DIAGNOSIS — M17.0 PRIMARY OSTEOARTHRITIS OF BOTH KNEES: ICD-10-CM

## 2023-09-28 LAB
ATRIAL RATE: 77 BPM
P AXIS: 65 DEGREES
P-R INTERVAL: 170 MS
Q-T INTERVAL: 394 MS
QRS DURATION: 78 MS
QTC CALCULATION (BEZET): 445 MS
R AXIS: 63 DEGREES
T AXIS: 62 DEGREES
VENTRICULAR RATE: 77 BPM

## 2023-09-28 PROCEDURE — 99214 OFFICE O/P EST MOD 30 MIN: CPT | Performed by: FAMILY MEDICINE

## 2023-09-28 PROCEDURE — 3074F SYST BP LT 130 MM HG: CPT | Performed by: FAMILY MEDICINE

## 2023-09-28 PROCEDURE — 93010 ELECTROCARDIOGRAM REPORT: CPT | Performed by: INTERNAL MEDICINE

## 2023-09-28 PROCEDURE — 3079F DIAST BP 80-89 MM HG: CPT | Performed by: FAMILY MEDICINE

## 2023-09-28 PROCEDURE — 1159F MED LIST DOCD IN RCRD: CPT | Performed by: FAMILY MEDICINE

## 2023-09-28 PROCEDURE — 93005 ELECTROCARDIOGRAM TRACING: CPT

## 2023-09-28 PROCEDURE — 1160F RVW MEDS BY RX/DR IN RCRD: CPT | Performed by: FAMILY MEDICINE

## 2023-09-28 RX ORDER — ROSUVASTATIN CALCIUM 10 MG/1
10 TABLET, COATED ORAL NIGHTLY
Qty: 90 TABLET | Refills: 0 | Status: SHIPPED | OUTPATIENT
Start: 2023-09-28

## 2023-09-29 DIAGNOSIS — E11.22 TYPE 2 DM WITH CKD STAGE 3 AND HYPERTENSION (HCC): ICD-10-CM

## 2023-09-29 DIAGNOSIS — E78.00 ELEVATED CHOLESTEROL: ICD-10-CM

## 2023-09-29 DIAGNOSIS — I12.9 TYPE 2 DM WITH CKD STAGE 3 AND HYPERTENSION (HCC): ICD-10-CM

## 2023-09-29 DIAGNOSIS — R53.83 OTHER FATIGUE: ICD-10-CM

## 2023-09-29 DIAGNOSIS — R06.02 SOB (SHORTNESS OF BREATH): Primary | ICD-10-CM

## 2023-09-29 DIAGNOSIS — N18.30 TYPE 2 DM WITH CKD STAGE 3 AND HYPERTENSION (HCC): ICD-10-CM

## 2023-09-29 DIAGNOSIS — Z87.891 FORMER TOBACCO USE: ICD-10-CM

## 2023-09-29 DIAGNOSIS — Z82.49 FAMILY HISTORY OF EARLY CAD: ICD-10-CM

## 2023-09-29 DIAGNOSIS — R73.9 HYPERGLYCEMIA: ICD-10-CM

## 2023-09-29 DIAGNOSIS — R94.31 ABNORMAL EKG: ICD-10-CM

## 2023-10-03 ENCOUNTER — HOSPITAL ENCOUNTER (OUTPATIENT)
Dept: CV DIAGNOSTICS | Facility: HOSPITAL | Age: 68
Discharge: HOME | End: 2023-10-03
Attending: FAMILY MEDICINE
Payer: MEDICARE

## 2023-10-03 DIAGNOSIS — Z82.49 FAMILY HISTORY OF EARLY CAD: ICD-10-CM

## 2023-10-03 DIAGNOSIS — R06.02 SOB (SHORTNESS OF BREATH): ICD-10-CM

## 2023-10-03 DIAGNOSIS — I12.9 TYPE 2 DM WITH CKD STAGE 3 AND HYPERTENSION (HCC): ICD-10-CM

## 2023-10-03 DIAGNOSIS — R73.9 HYPERGLYCEMIA: ICD-10-CM

## 2023-10-03 DIAGNOSIS — Z87.891 FORMER TOBACCO USE: ICD-10-CM

## 2023-10-03 DIAGNOSIS — R53.83 OTHER FATIGUE: ICD-10-CM

## 2023-10-03 DIAGNOSIS — E11.22 TYPE 2 DM WITH CKD STAGE 3 AND HYPERTENSION (HCC): ICD-10-CM

## 2023-10-03 DIAGNOSIS — N18.30 TYPE 2 DM WITH CKD STAGE 3 AND HYPERTENSION (HCC): ICD-10-CM

## 2023-10-03 PROCEDURE — 93018 CV STRESS TEST I&R ONLY: CPT | Performed by: FAMILY MEDICINE

## 2023-10-03 PROCEDURE — 78452 HT MUSCLE IMAGE SPECT MULT: CPT | Performed by: FAMILY MEDICINE

## 2023-10-03 PROCEDURE — 93017 CV STRESS TEST TRACING ONLY: CPT | Performed by: FAMILY MEDICINE

## 2023-10-03 RX ORDER — REGADENOSON 0.08 MG/ML
INJECTION, SOLUTION INTRAVENOUS
Status: COMPLETED
Start: 2023-10-03 | End: 2023-10-03

## 2023-10-03 RX ADMIN — REGADENOSON 0.4 MG: 0.08 INJECTION, SOLUTION INTRAVENOUS at 14:00:00

## 2023-10-04 ENCOUNTER — RT VISIT (OUTPATIENT)
Dept: RESPIRATORY THERAPY | Facility: HOSPITAL | Age: 68
End: 2023-10-04
Attending: FAMILY MEDICINE
Payer: MEDICARE

## 2023-10-04 DIAGNOSIS — R06.02 SOB (SHORTNESS OF BREATH): ICD-10-CM

## 2023-10-04 DIAGNOSIS — Z87.891 FORMER TOBACCO USE: ICD-10-CM

## 2023-10-04 PROCEDURE — 94060 EVALUATION OF WHEEZING: CPT | Performed by: INTERNAL MEDICINE

## 2023-10-04 PROCEDURE — 94729 DIFFUSING CAPACITY: CPT | Performed by: INTERNAL MEDICINE

## 2023-10-04 PROCEDURE — 94726 PLETHYSMOGRAPHY LUNG VOLUMES: CPT | Performed by: INTERNAL MEDICINE

## 2023-10-06 PROBLEM — Z87.891 FORMER TOBACCO USE: Status: ACTIVE | Noted: 2023-10-06

## 2023-10-06 NOTE — PROCEDURES
Findings:  Postbronchodilator FEV1 is 1.88L, 76% predicted. Postbronchodilator FVC is 2.86L, 90% predicted. FEV1/ FVC ratio is 0.66. There is no significant bronchodilator response after administration of albuterol. The flow-volume loop suggests a normal pattern. The TLC is 5.80L, 116% predicted. The residual volume 2.95L, 152% predicted. The diffusion capacity is 81% predicted and 84% predicted when corrected for alveolar volume. Impression:  There is no airway obstruction on spirometry and visualized on flow-volume loop. There is no significant bronchodilator response, but this does not preclude treatment with bronchodilators. Despite the absence of airway obstruction, there is evidence of air trapping (residual volume of 2.95L, 152% predicted). Diffusion capacity is normal.    There are no previous pulmonary function tests available for comparison.

## 2023-10-09 DIAGNOSIS — R06.02 SOB (SHORTNESS OF BREATH): Primary | ICD-10-CM

## 2023-10-11 LAB — AMB EXT COLOGUARD RESULT: NEGATIVE

## 2023-10-24 ENCOUNTER — OFFICE VISIT (OUTPATIENT)
Facility: CLINIC | Age: 68
End: 2023-10-24

## 2023-10-24 VITALS
RESPIRATION RATE: 18 BRPM | WEIGHT: 190 LBS | OXYGEN SATURATION: 94 % | BODY MASS INDEX: 32.44 KG/M2 | DIASTOLIC BLOOD PRESSURE: 80 MMHG | HEART RATE: 100 BPM | HEIGHT: 64 IN | SYSTOLIC BLOOD PRESSURE: 130 MMHG

## 2023-10-24 DIAGNOSIS — R06.00 DYSPNEA AND RESPIRATORY ABNORMALITIES: Primary | ICD-10-CM

## 2023-10-24 DIAGNOSIS — R06.89 DYSPNEA AND RESPIRATORY ABNORMALITIES: Primary | ICD-10-CM

## 2023-10-24 PROCEDURE — 1160F RVW MEDS BY RX/DR IN RCRD: CPT | Performed by: INTERNAL MEDICINE

## 2023-10-24 PROCEDURE — 3079F DIAST BP 80-89 MM HG: CPT | Performed by: INTERNAL MEDICINE

## 2023-10-24 PROCEDURE — 3075F SYST BP GE 130 - 139MM HG: CPT | Performed by: INTERNAL MEDICINE

## 2023-10-24 PROCEDURE — 3008F BODY MASS INDEX DOCD: CPT | Performed by: INTERNAL MEDICINE

## 2023-10-24 PROCEDURE — 99204 OFFICE O/P NEW MOD 45 MIN: CPT | Performed by: INTERNAL MEDICINE

## 2023-10-24 PROCEDURE — 1159F MED LIST DOCD IN RCRD: CPT | Performed by: INTERNAL MEDICINE

## 2023-10-24 NOTE — PATIENT INSTRUCTIONS
Plan-- to get 6 minute walk              - to get overnight oximetry              - to get mannitol challenge -               - see me in 6-8 weeks      Miriam Oconnor MD  Pulmonary Medicine  10/24/2023

## 2023-10-30 ENCOUNTER — TELEPHONE (OUTPATIENT)
Dept: FAMILY MEDICINE CLINIC | Facility: CLINIC | Age: 68
End: 2023-10-30

## 2023-10-30 ENCOUNTER — RT VISIT (OUTPATIENT)
Dept: RESPIRATORY THERAPY | Facility: HOSPITAL | Age: 68
End: 2023-10-30
Attending: INTERNAL MEDICINE
Payer: MEDICARE

## 2023-10-30 DIAGNOSIS — R06.00 DYSPNEA AND RESPIRATORY ABNORMALITIES: ICD-10-CM

## 2023-10-30 DIAGNOSIS — R06.89 DYSPNEA AND RESPIRATORY ABNORMALITIES: ICD-10-CM

## 2023-10-30 PROCEDURE — 94060 EVALUATION OF WHEEZING: CPT

## 2023-11-27 ENCOUNTER — HOSPITAL ENCOUNTER (OUTPATIENT)
Dept: CT IMAGING | Facility: HOSPITAL | Age: 68
Discharge: HOME | End: 2023-11-27
Attending: FAMILY MEDICINE
Payer: MEDICARE

## 2023-11-27 ENCOUNTER — HOSPITAL ENCOUNTER (OUTPATIENT)
Dept: MAMMOGRAPHY | Facility: HOSPITAL | Age: 68
Discharge: HOME | End: 2023-11-27
Attending: FAMILY MEDICINE
Payer: MEDICARE

## 2023-11-27 DIAGNOSIS — Z12.2 SCREENING FOR LUNG CANCER: ICD-10-CM

## 2023-11-27 DIAGNOSIS — Z12.31 ENCOUNTER FOR SCREENING MAMMOGRAM FOR HIGH-RISK PATIENT: ICD-10-CM

## 2023-11-27 PROCEDURE — 77063 BREAST TOMOSYNTHESIS BI: CPT | Performed by: FAMILY MEDICINE

## 2023-11-27 PROCEDURE — 71271 CT THORAX LUNG CANCER SCR C-: CPT | Performed by: FAMILY MEDICINE

## 2023-11-27 PROCEDURE — 77067 SCR MAMMO BI INCL CAD: CPT | Performed by: FAMILY MEDICINE

## 2023-11-30 ENCOUNTER — TELEPHONE (OUTPATIENT)
Facility: CLINIC | Age: 68
End: 2023-11-30

## 2023-11-30 ENCOUNTER — NURSE ONLY (OUTPATIENT)
Facility: CLINIC | Age: 68
End: 2023-11-30
Payer: MEDICARE

## 2023-11-30 VITALS — WEIGHT: 190 LBS | BODY MASS INDEX: 32.44 KG/M2 | HEIGHT: 64 IN

## 2023-11-30 DIAGNOSIS — G47.33 OSA (OBSTRUCTIVE SLEEP APNEA): Primary | ICD-10-CM

## 2023-11-30 PROCEDURE — 3008F BODY MASS INDEX DOCD: CPT | Performed by: INTERNAL MEDICINE

## 2023-11-30 PROCEDURE — 94618 PULMONARY STRESS TESTING: CPT | Performed by: INTERNAL MEDICINE

## 2023-11-30 NOTE — PROGRESS NOTES
OXYGEN CLINIC ASSESSMENT    Date: 2023 Physician: DR. Allison Ortiz   Diagnosis: DYSPNEA Technician: Nba Driver., RT     Patient: Ellie Randolph MRN: [id number] : 1955     Height: 5' 4\" (1.626 m) Weight: 190 lb Age: 76year old         BP HR SpO2 RR KIT DIST  (FT) TIME Reason Stopped   RA         REST 120/70   68   97     16     0     N/A         N/A         N/A           RA       PEAK  EXERCISE 122/80 84 92   24   3   1200   6 MIN   Study Ended                FINDINGS  0 LPM required to maintain a saturation of 97 % at rest   0 LPM required to maintain a saturation of 92 % with exertion     LPM Pulse dose to maintain a saturation of   % with exertion   Patient does not need supplemental oxygen at rest or for exertion.

## 2023-12-05 ENCOUNTER — OFFICE VISIT (OUTPATIENT)
Facility: CLINIC | Age: 68
End: 2023-12-05
Payer: MEDICARE

## 2023-12-05 VITALS
WEIGHT: 194 LBS | DIASTOLIC BLOOD PRESSURE: 84 MMHG | RESPIRATION RATE: 16 BRPM | SYSTOLIC BLOOD PRESSURE: 138 MMHG | HEART RATE: 68 BPM | HEIGHT: 64 IN | OXYGEN SATURATION: 98 % | BODY MASS INDEX: 33.12 KG/M2

## 2023-12-05 DIAGNOSIS — R06.89 DYSPNEA AND RESPIRATORY ABNORMALITIES: Primary | ICD-10-CM

## 2023-12-05 DIAGNOSIS — R06.00 DYSPNEA AND RESPIRATORY ABNORMALITIES: Primary | ICD-10-CM

## 2023-12-05 PROCEDURE — 1159F MED LIST DOCD IN RCRD: CPT | Performed by: INTERNAL MEDICINE

## 2023-12-05 PROCEDURE — 3079F DIAST BP 80-89 MM HG: CPT | Performed by: INTERNAL MEDICINE

## 2023-12-05 PROCEDURE — 1160F RVW MEDS BY RX/DR IN RCRD: CPT | Performed by: INTERNAL MEDICINE

## 2023-12-05 PROCEDURE — 99214 OFFICE O/P EST MOD 30 MIN: CPT | Performed by: INTERNAL MEDICINE

## 2023-12-05 PROCEDURE — 3075F SYST BP GE 130 - 139MM HG: CPT | Performed by: INTERNAL MEDICINE

## 2023-12-05 PROCEDURE — 3008F BODY MASS INDEX DOCD: CPT | Performed by: INTERNAL MEDICINE

## 2023-12-05 RX ORDER — FLUTICASONE PROPIONATE AND SALMETEROL 250; 50 UG/1; UG/1
1 POWDER RESPIRATORY (INHALATION) 2 TIMES DAILY
Qty: 1 EACH | Refills: 5 | Status: SHIPPED | OUTPATIENT
Start: 2023-12-05

## 2023-12-05 NOTE — PATIENT INSTRUCTIONS
Plan-- to begin advair  one puff twice a day - rinse and spit - call if too expensive -            -  to get overnight oximetry            - see me in 3 months     Evgeny Bryant MD  Pulmonary Medicine  12/5/2023

## 2023-12-06 ENCOUNTER — TELEPHONE (OUTPATIENT)
Facility: CLINIC | Age: 68
End: 2023-12-06

## 2023-12-06 NOTE — TELEPHONE ENCOUNTER
Per Dr. Tarah Morley: Can you please order overnight oximetry on this patient. Order sent to E as well as office visit notes. [facility] sent to patient to notify of order being sent to [facility]. [facility] number provided. Fax confirmation received. [facility] msg read by pt.

## 2023-12-20 DIAGNOSIS — Z82.49 FAMILY HISTORY OF EARLY CAD: ICD-10-CM

## 2023-12-20 DIAGNOSIS — E11.22 TYPE 2 DM WITH CKD STAGE 3 AND HYPERTENSION (HCC): ICD-10-CM

## 2023-12-20 DIAGNOSIS — N18.30 TYPE 2 DM WITH CKD STAGE 3 AND HYPERTENSION (HCC): ICD-10-CM

## 2023-12-20 DIAGNOSIS — I12.9 TYPE 2 DM WITH CKD STAGE 3 AND HYPERTENSION (HCC): ICD-10-CM

## 2023-12-21 RX ORDER — ROSUVASTATIN CALCIUM 10 MG/1
10 TABLET, COATED ORAL NIGHTLY
Qty: 90 TABLET | Refills: 0 | Status: SHIPPED | OUTPATIENT
Start: 2023-12-21

## 2023-12-25 DIAGNOSIS — I10 HYPERTENSION, UNSPECIFIED TYPE: ICD-10-CM

## 2023-12-26 RX ORDER — LOSARTAN POTASSIUM 25 MG/1
25 TABLET ORAL DAILY
Qty: 90 TABLET | Refills: 1 | Status: SHIPPED | OUTPATIENT
Start: 2023-12-26

## 2024-02-19 NOTE — PROGRESS NOTES
[facility] Progress Note      Patient Name:  Kaley Thorpe  YOB: 1955  Medical Record Number:  [id number]    Date of visit:  2/21/2024    CHIEF COMPLAINT: Hemochromatosis.    HPI:     68 year old that I have followed since 1/23 for polycythemia which has been noted since 2017.  W/u- hereditary hemochromatosis with heterozygous H63D mutation.  Therapeutic phlebotomy recommended.  Presents for evaluation.    5-6 phleb in last year, last in Dec 12/23. Mild fatigue.     SOCIAL HISTORY:    .     ROS:     Constitutional: no fever, chills fatigue,night sweats or weight loss  Neurologic: no headache, seizures, diplopia or weakness  Respiratory: no cough, SOB or hemoptysis  Cardiovascular: no chest pain, ankle swelling, MITCHELL  GI: no nausea, vomiting, diarrhea or BRBPR  Musculoskeletal: no body-ache or joint pain  Dermatologic: no alopecia, rash, pruritis  : no hematuria, dysuria or frequency  Psych: no confusion or depression   Heme: no easy bruising or bleeding     ALLERGIES:    Allergies   Allergen Reactions    Topamax [Topiramate] OTHER (SEE COMMENTS)     Dizziness / confusion     Morphine ITCHING       MEDICATIONS:    Current Outpatient Medications   Medication Sig Dispense Refill    lisdexamfetamine 50 MG Oral Cap Take 1 capsule (50 mg total) by mouth every morning.      zolpidem 10 MG Oral Tab Take 1 tablet (10 mg total) by mouth nightly as needed. AT BEDTIME      LOSARTAN 25 MG Oral Tab TAKE 1 TABLET (25 MG TOTAL) BY MOUTH DAILY. 90 tablet 1    ROSUVASTATIN 10 MG Oral Tab TAKE 1 TABLET BY MOUTH EVERY DAY AT NIGHT 90 tablet 0    fluticasone-salmeterol 250-50 MCG/ACT Inhalation Aerosol Powder, Breath Activated Inhale 1 puff into the lungs 2 (two) times daily. inhale 1 puff by INHALATION route 2 times every day morning and evening approximately 12 hours apart 1 each 5    folic acid 1 MG Oral Tab Take 1 tablet (1 mg total) by mouth daily. 90 tablet 2    fluocinonide 0.05 % External  Cream Apply bid For 10-14 days 30 g 1    aspirin 81 MG Oral Tab EC Take 1 tablet (81 mg total) by mouth daily.      REXULTI 4 MG Oral Tab Take 1 tablet by mouth daily.      VYVANSE 60 MG Oral Cap Take 1 capsule (60 mg total) by mouth every morning.      ibuprofen 200 MG Oral Tab Take 1 tablet (200 mg total) by mouth every 6 (six) hours as needed for Pain.      Calcium Carbonate Antacid (OSCAR-SELTZER ANTACID OR) Take by mouth.      metFORMIN  MG Oral Tablet 24 Hr Take 1 tablet (500 mg total) by mouth daily.      cyanocobalamine 1000 MCG Oral Tab Take 1 tablet (1,000 mcg total) by mouth daily.      amphetamine-dextroamphetamine 20 MG Oral Tab Take 1 tablet (20 mg total) by mouth daily.      traZODone 50 MG Oral Tab Take 1 tablet (50 mg total) by mouth nightly.      FLUTICASONE PROPIONATE 50 MCG/ACT Nasal Suspension SHAKE LIQUID AND USE 1 SPRAY IN EACH NOSTRIL DAILY (Patient taking differently: as needed.) 1 Bottle 2    FLUoxetine HCl 20 MG Oral Tab Take 1 tablet (20 mg total) by mouth daily.  0    ClonazePAM (KLONOPIN) 0.5 MG Oral Tab Take 1 tablet (0.5 mg total) by mouth 2 (two) times daily as needed for Anxiety.         VITALS:  Height: --  Weight: 87.6 kg (193 lb 3.2 oz) (1121)  BSA (Calculated - sq m): --  Pulse: 68 (1121)  BP: 132/83 (1121)  Temp: 96.9 °F (36.1 °C) (1121)  Do Not Use - Resp Rate: --  SpO2: 96 % (1121)    PS:  ECO    PHYSICAL EXAM:    General: alert and oriented x 3, not in acute distress.  HEENT: KATLYN, oropharynx  clear.  Neck: supple.  No JVD /adenopathy.  CVS: S1S2, regular  Rhythm, no murmurs.   Lungs: Clear to auscultation and percussion.  Abdomen: Soft, non tender, no hepato-splenomegaly.  Extremities:  No edema.  CNS: no focal deficit    Emotional well being: Patient's emotional well being was assessed.  No issues requiring acute psychosocial intervention were identified.     LABS:     Recent Results (from the past 24 hour(s))   Comp Metabolic Panel  (14)    Collection Time: 02/21/24 11:15 AM   Result Value Ref Range    Glucose 106 (H) 70 - 99 mg/dL    Sodium 138 136 - 145 mmol/L    Potassium 4.9 3.5 - 5.1 mmol/L    Chloride 109 98 - 112 mmol/L    CO2 27.0 21.0 - 32.0 mmol/L    Anion Gap 2 0 - 18 mmol/L    BUN 11 9 - 23 mg/dL    Creatinine 0.86 0.55 - 1.02 mg/dL    Calcium, Total 9.7 8.5 - 10.1 mg/dL    Calculated Osmolality 286 275 - 295 mOsm/kg    eGFR-Cr 74 >=60 mL/min/1.73m2    AST 36 15 - 37 U/L    ALT 38 13 - 56 U/L    Alkaline Phosphatase 94 55 - 142 U/L    Bilirubin, Total 1.5 0.1 - 2.0 mg/dL    Total Protein 7.8 6.4 - 8.2 g/dL    Albumin 4.1 3.4 - 5.0 g/dL    Globulin  3.7 2.8 - 4.4 g/dL    A/G Ratio 1.1 1.0 - 2.0    Patient Fasting for CMP? Patient not present    Ferritin    Collection Time: 02/21/24 11:15 AM   Result Value Ref Range    Ferritin 51.1 18.0 - 340.0 ng/mL   Iron And Tibc    Collection Time: 02/21/24 11:15 AM   Result Value Ref Range    Iron 95 50 - 170 ug/dL    Transferrin 318 200 - 360 mg/dL    Total Iron Binding Capacity 474 (H) 240 - 450 ug/dL    % Saturation 20 15 - 50 %   CBC W/ DIFFERENTIAL    Collection Time: 02/21/24 11:15 AM   Result Value Ref Range    WBC 7.3 4.0 - 11.0 x10(3) uL    RBC 4.56 3.80 - 5.30 x10(6)uL    HGB 14.9 12.0 - 16.0 g/dL    HCT 44.3 35.0 - 48.0 %    .0 150.0 - 450.0 10(3)uL    MCV 97.1 80.0 - 100.0 fL    MCH 32.7 26.0 - 34.0 pg    MCHC 33.6 31.0 - 37.0 g/dL    RDW 12.6 %    Neutrophil Absolute Prelim 3.59 1.50 - 7.70 x10 (3) uL    Neutrophil Absolute 3.59 1.50 - 7.70 x10(3) uL    Lymphocyte Absolute 2.46 1.00 - 4.00 x10(3) uL    Monocyte Absolute 0.60 0.10 - 1.00 x10(3) uL    Eosinophil Absolute 0.47 0.00 - 0.70 x10(3) uL    Basophil Absolute 0.11 0.00 - 0.20 x10(3) uL    Immature Granulocyte Absolute 0.02 0.00 - 1.00 x10(3) uL    Neutrophil % 49.5 %    Lymphocyte % 33.9 %    Monocyte % 8.3 %    Eosinophil % 6.5 %    Basophil % 1.5 %    Immature Granulocyte % 0.3 %       ASSESSMENT AND PLAN:     #  Hereditary hemochromatosis: Diag 1/23. Het H63D mutation.  The last 12 months, she has had 5 or 6 phlebotomies.  Iron levels are at goal.  Recommend phlebotomy every 3 months for this year.    # Folic acid defi: better with replacement.    ORDERS PLACED:    Return in about 6 months (around 8/21/2024) for Labs, MD visit.    Lelo Vaughn M.D.    [facility]    [address]  [place], [address]    2/21/2024

## 2024-02-21 ENCOUNTER — APPOINTMENT (OUTPATIENT)
Dept: HEMATOLOGY/ONCOLOGY | Facility: HOSPITAL | Age: 69
End: 2024-02-21
Attending: INTERNAL MEDICINE
Payer: MEDICARE

## 2024-02-21 VITALS
OXYGEN SATURATION: 96 % | BODY MASS INDEX: 33 KG/M2 | DIASTOLIC BLOOD PRESSURE: 83 MMHG | WEIGHT: 193.19 LBS | TEMPERATURE: 97 F | RESPIRATION RATE: 18 BRPM | SYSTOLIC BLOOD PRESSURE: 132 MMHG | HEART RATE: 68 BPM

## 2024-02-21 DIAGNOSIS — I12.9 TYPE 2 DM WITH CKD STAGE 3 AND HYPERTENSION (HCC): ICD-10-CM

## 2024-02-21 DIAGNOSIS — I10 HYPERTENSION, UNSPECIFIED TYPE: ICD-10-CM

## 2024-02-21 DIAGNOSIS — E55.9 VITAMIN D DEFICIENCY: ICD-10-CM

## 2024-02-21 DIAGNOSIS — E11.22 TYPE 2 DM WITH CKD STAGE 3 AND HYPERTENSION (HCC): ICD-10-CM

## 2024-02-21 DIAGNOSIS — N18.30 TYPE 2 DM WITH CKD STAGE 3 AND HYPERTENSION (HCC): ICD-10-CM

## 2024-02-21 DIAGNOSIS — E78.00 ELEVATED CHOLESTEROL: ICD-10-CM

## 2024-02-21 DIAGNOSIS — E53.8 FOLIC ACID DEFICIENCY: ICD-10-CM

## 2024-02-21 DIAGNOSIS — E83.110 HEREDITARY HEMOCHROMATOSIS (HCC): ICD-10-CM

## 2024-02-21 DIAGNOSIS — E53.8 VITAMIN B12 DEFICIENCY: ICD-10-CM

## 2024-02-21 LAB
AFP-TM SERPL-MCNC: 5.8 NG/ML (ref ?–8)
ALBUMIN SERPL-MCNC: 4.1 G/DL (ref 3.4–5)
ALBUMIN/GLOB SERPL: 1.1 {RATIO} (ref 1–2)
ALP LIVER SERPL-CCNC: 94 U/L
ALT SERPL-CCNC: 38 U/L
ANION GAP SERPL CALC-SCNC: 2 MMOL/L (ref 0–18)
AST SERPL-CCNC: 36 U/L (ref 15–37)
BASOPHILS # BLD AUTO: 0.11 X10(3) UL (ref 0–0.2)
BASOPHILS NFR BLD AUTO: 1.5 %
BILIRUB SERPL-MCNC: 1.5 MG/DL (ref 0.1–2)
BUN BLD-MCNC: 11 MG/DL (ref 9–23)
CALCIUM BLD-MCNC: 9.7 MG/DL (ref 8.5–10.1)
CHLORIDE SERPL-SCNC: 109 MMOL/L (ref 98–112)
CO2 SERPL-SCNC: 27 MMOL/L (ref 21–32)
CREAT BLD-MCNC: 0.86 MG/DL
DEPRECATED HBV CORE AB SER IA-ACNC: 51.1 NG/ML
EGFRCR SERPLBLD CKD-EPI 2021: 74 ML/MIN/1.73M2 (ref 60–?)
EOSINOPHIL # BLD AUTO: 0.47 X10(3) UL (ref 0–0.7)
EOSINOPHIL NFR BLD AUTO: 6.5 %
ERYTHROCYTE [DISTWIDTH] IN BLOOD BY AUTOMATED COUNT: 12.6 %
GLOBULIN PLAS-MCNC: 3.7 G/DL (ref 2.8–4.4)
GLUCOSE BLD-MCNC: 106 MG/DL (ref 70–99)
HCT VFR BLD AUTO: 44.3 %
HGB BLD-MCNC: 14.9 G/DL
IMM GRANULOCYTES # BLD AUTO: 0.02 X10(3) UL (ref 0–1)
IMM GRANULOCYTES NFR BLD: 0.3 %
IRON SATN MFR SERPL: 20 %
IRON SERPL-MCNC: 95 UG/DL
LYMPHOCYTES # BLD AUTO: 2.46 X10(3) UL (ref 1–4)
LYMPHOCYTES NFR BLD AUTO: 33.9 %
MCH RBC QN AUTO: 32.7 PG (ref 26–34)
MCHC RBC AUTO-ENTMCNC: 33.6 G/DL (ref 31–37)
MCV RBC AUTO: 97.1 FL
MONOCYTES # BLD AUTO: 0.6 X10(3) UL (ref 0.1–1)
MONOCYTES NFR BLD AUTO: 8.3 %
NEUTROPHILS # BLD AUTO: 3.59 X10 (3) UL (ref 1.5–7.7)
NEUTROPHILS # BLD AUTO: 3.59 X10(3) UL (ref 1.5–7.7)
NEUTROPHILS NFR BLD AUTO: 49.5 %
OSMOLALITY SERPL CALC.SUM OF ELEC: 286 MOSM/KG (ref 275–295)
PLATELET # BLD AUTO: 202 10(3)UL (ref 150–450)
POTASSIUM SERPL-SCNC: 4.9 MMOL/L (ref 3.5–5.1)
PROT SERPL-MCNC: 7.8 G/DL (ref 6.4–8.2)
RBC # BLD AUTO: 4.56 X10(6)UL
SODIUM SERPL-SCNC: 138 MMOL/L (ref 136–145)
TIBC SERPL-MCNC: 474 UG/DL (ref 240–450)
TRANSFERRIN SERPL-MCNC: 318 MG/DL (ref 200–360)
WBC # BLD AUTO: 7.3 X10(3) UL (ref 4–11)

## 2024-02-21 PROCEDURE — 99214 OFFICE O/P EST MOD 30 MIN: CPT | Performed by: INTERNAL MEDICINE

## 2024-02-21 RX ORDER — LISDEXAMFETAMINE DIMESYLATE CAPSULES 50 MG/1
50 CAPSULE ORAL EVERY MORNING
COMMUNITY
Start: 2023-12-13

## 2024-02-21 RX ORDER — ZOLPIDEM TARTRATE 10 MG/1
10 TABLET ORAL NIGHTLY PRN
COMMUNITY
Start: 2024-02-14

## 2024-02-21 NOTE — PROGRESS NOTES
Education Record    Learner:  Patient    Disease / Diagnosis:  Hemochromatosis     Barriers / Limitations:  None   Comments:    Method:  Discussion   Comments:    General Topics:  Plan of care reviewed   Comments:    Outcome:  Shows understanding   Comments:   Last blood donation 12/28/23.   Feeing a little stiff.

## 2024-02-22 ENCOUNTER — TELEPHONE (OUTPATIENT)
Dept: HEMATOLOGY/ONCOLOGY | Facility: HOSPITAL | Age: 69
End: 2024-02-22

## 2024-02-22 NOTE — TELEPHONE ENCOUNTER
Kb Vaughn MD  [facility]  Please let patient know that iron levels look better.  She should do phlebotomy in 3 months and 6 months and see me with labs in 6 months after second phlebotomy    Reviewed the above with patient.   Verbalized understanding

## 2024-03-05 ENCOUNTER — OFFICE VISIT (OUTPATIENT)
Facility: CLINIC | Age: 69
End: 2024-03-05
Payer: MEDICARE

## 2024-03-05 VITALS
SYSTOLIC BLOOD PRESSURE: 132 MMHG | DIASTOLIC BLOOD PRESSURE: 78 MMHG | WEIGHT: 194 LBS | RESPIRATION RATE: 18 BRPM | OXYGEN SATURATION: 94 % | HEIGHT: 64 IN | HEART RATE: 72 BPM | BODY MASS INDEX: 33.12 KG/M2

## 2024-03-05 DIAGNOSIS — R06.89 DYSPNEA AND RESPIRATORY ABNORMALITIES: Primary | ICD-10-CM

## 2024-03-05 DIAGNOSIS — R06.00 DYSPNEA AND RESPIRATORY ABNORMALITIES: Primary | ICD-10-CM

## 2024-03-05 PROCEDURE — 99214 OFFICE O/P EST MOD 30 MIN: CPT | Performed by: INTERNAL MEDICINE

## 2024-03-05 RX ORDER — ALBUTEROL SULFATE 90 UG/1
2 AEROSOL, METERED RESPIRATORY (INHALATION) EVERY 4 HOURS PRN
Qty: 1 EACH | Refills: 11 | Status: SHIPPED | OUTPATIENT
Start: 2024-03-05

## 2024-03-05 NOTE — PROGRESS NOTES
Pulmonary Consult Note    History of Present Illness:  Kaley Thorpe is a 68 year old female presenting to pulmonary clinic today for dypsnea for many years   In 20s got asthma - sick building and smoking - attacks - wheeze and diffuiclty breathing - cough helped get mucus out - OTC - got better after moving out -   Walking up hill , dancing - carrying laundry up - - gradually progressive worse   No rest sx- no cough no cp-- neg stress - to see cardio -   Had pFTS   No exercise   Quit 12 yrs ago -- one ppd -  - - some smoking weed- - twice a week - gummies as well   Etoh- 2 glasses of wine daily - no rehab issues     12.-breathing is the same bending over and up and down the stairs - thinks getting worse  At rest - feels OK-   No coughing -   Smoking no cigs a little weed -   No cp   Tearful in the office    3.- thinks a little better- inhaler helps a little - staying on it   Tries to remember to take twice a day - most of the time twice a day   Stairs feels limited- - but better - no cough   No cp   A little weed smoked - 2-3 times a week- - trying to do edibles   9pm generally   Not able to dance much  Cold air makes it worse -           Past Medical History:   Past Medical History:   Diagnosis Date    Allergic rhinitis April-    Anxiety     Asthma (HCC)     as a young adult    Attention deficit hyperactivity disorder (ADHD)     Depression     High blood pressure     Visual impairment     glasses    No cardiac hx-- HTN -   Hemochromatosis -- dx - last plhebotomy - in July -   No cancers   No hx clots   No hx ulcers or colitits - [facility] no [facility]x [facility]   Sleeping - told needed a study     Past Surgical History:   Past Surgical History:   Procedure Laterality Date          CHOLECYSTECTOMY      FRACTURE SURGERY Left     wrist    NEEDLE BIOPSY RIGHT Right 2020    Stereo             Family Medical History:   Family History   Problem Relation Age of Onset     Cancer Mother         lung    Cancer Father         lung    Breast Cancer Paternal Cousin Female         2 OF THEM       Social History: Social History    Socioeconomic History      Marital status:     Tobacco Use      Smoking status: Former        Packs/day: 1.00        Years: 30.00        Additional pack years: 0.00        Total pack years: 30.00        Types: Cigarettes        Quit date:         Years since quittin.8      Smokeless tobacco: Never    Vaping Use      Vaping Use: Never used    Substance and Sexual Activity      Alcohol use: Yes        Alcohol/week: 2.0 standard drinks of alcohol        Types: 2 Glasses of wine per week        Comment: daily couple glasses of wine.      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      zolpidem 10 MG Oral Tab Take 1 tablet (10 mg total) by mouth nightly as needed. AT BEDTIME      LOSARTAN 25 MG Oral Tab TAKE 1 TABLET (25 MG TOTAL) BY MOUTH DAILY. 90 tablet 1    ROSUVASTATIN 10 MG Oral Tab TAKE 1 TABLET BY MOUTH EVERY DAY AT NIGHT 90 tablet 0    fluticasone-salmeterol 250-50 MCG/ACT Inhalation Aerosol Powder, Breath Activated Inhale 1 puff into the lungs 2 (two) times daily. inhale 1 puff by INHALATION route 2 times every day morning and evening approximately 12 hours apart 1 each 5    folic acid 1 MG Oral Tab Take 1 tablet (1 mg total) by mouth daily. 90 tablet 2    fluocinonide 0.05 % External Cream Apply bid For 10-14 days 30 g 1    aspirin 81 MG Oral Tab EC Take 1 tablet (81 mg total) by mouth daily.      REXULTI 4 MG Oral Tab Take 1 tablet by mouth daily.      VYVANSE 60 MG Oral Cap Take 1 capsule (60 mg total) by  mouth every morning.      ibuprofen 200 MG Oral Tab Take 1 tablet (200 mg total) by mouth every 6 (six) hours as needed for Pain.      Calcium Carbonate Antacid (OSCAR-SELTZER ANTACID OR) Take by mouth.      metFORMIN  MG Oral Tablet 24 Hr Take 1 tablet (500 mg total) by mouth daily.      cyanocobalamine 1000 MCG Oral Tab Take 1 tablet (1,000 mcg total) by mouth daily.      amphetamine-dextroamphetamine 20 MG Oral Tab Take 1 tablet (20 mg total) by mouth daily.      FLUTICASONE PROPIONATE 50 MCG/ACT Nasal Suspension SHAKE LIQUID AND USE 1 SPRAY IN EACH NOSTRIL DAILY (Patient taking differently: as needed.) 1 Bottle 2    FLUoxetine HCl 20 MG Oral Tab Take 1 tablet (20 mg total) by mouth daily.  0    traZODone 50 MG Oral Tab Take 1 tablet (50 mg total) by mouth nightly. (Patient not taking: Reported on 3/5/2024)      ClonazePAM (KLONOPIN) 0.5 MG Oral Tab Take 1 tablet (0.5 mg total) by mouth 2 (two) times daily as needed for Anxiety. (Patient not taking: Reported on 3/5/2024)         Review of Systems: weight stable - sl up   Gerd if lays down after eating or pills - takes oscar seltzer --   Sleeping all night - 4am nocturia - needs medication to sleep -   Never rested- very fatigued -- takes awhile to get going- - denies hypersomlence    states no snoring -   No skin lesions or hives   Some sinus drainage - seasonal allergies - - had testing dx asthma - mold cats and dust- tomatoes - no shots   Using flonase only with allergy season only   Sleeping - thinks fine - tired all the time - Ambien at night   Mamms OK   Did not get overnight oximetery -    Physical Exam:  /78 (BP Location: Right arm, Patient Position: Sitting, Cuff Size: adult)   Pulse 72   Resp 18   Ht 5' 4\" (1.626 m)   Wt 194 lb (88 kg)   SpO2 94%   BMI 33.30 kg/m²    Constitutional: comfortable . No acute distress. Sometimes slow to respond tearful crying in the office  HEENT: Head NC/AT. PEERL. Throat is clear small area   Cardio:  Regular rate and rhythm. Normal S1 and S2. ? Sjort murmer   Respiratory: Thorax symmetrical with no labored breathing. . No wheezing, rhonchi, rales, or crackles.  More diminished today  GI:  Abd soft, non-tender.  Extremities: No clubbing or cyanosis. No LE edema. No calf tenderness.  Neurologic: A&Ox3. No gross motor deficits.  Skin: Warm, dry.no rashes or hives noted   Lymphatic: No cervical or supraclavicular lymphadenopathy.no jvd   Psych: Calm, cooperative. Pleasant affect.    Results:  Reviewed     Assessment/Plan:  #Dyspnea  Negative cardiac stress 10/3/2023- abnl EKG unchanged from 2021   To see cardio  H asthma-- pFTS with borderline obstriction -   Exertional -   Unclear -- to check mannitol and eval pulmonary -rule out component of JAREN  12.5.23 - ratio 66% with no BD  response- FEV1- 1.88l - 82%  To begin ICS/LABA as a trial discussed at length  3.24- thinks ics/laba helps some -- saw dr arias -- plans for echo - told deconditioning - - desat to 92% lowest on 6-minute walk  No significant activity        #History of tobacco abuse  Quit 2011 -2012   For screening CT yearly last CT 11/27/2023-without worrisome lesions-aware that she needs yearly screening  -3.24- some smoking weed 1 to twice per week--discussed-- no cigs     #Hereditary hemochromatosis with heterozygous H63D  Diagnosis 1/23  No issues - due for phebotomy - once every 3 months   Dr shelton   3.24- no changes       #Diabetes with chronic kidney disease    # htn  No other cardiac       #Sleep disorder  Difficulty initiating needs medication  Notes ongoing severe fatigue  Check overnight  3.24- overnight with 22min <88% though-  SUKHDEV 5.66 - to follow for need for study- states would not be able to wear cpap        Plan-- continue  advair  one puff twice a day - rinse and spit - call if too expensive -             - to get rescue inhaler as needed - every 4 hours -- consider prior to exercise            -  consider PEPCID as needed for stomach  GERD             - consider an exercise program -- cardipulmonary rehab by [facility] - OR consider walking 20min daily and progress!            - see me in 4 months     Dilia López MD  Pulmonary Medicine  3/5/2024

## 2024-03-05 NOTE — PATIENT INSTRUCTIONS
Plan-- continue  advair  one puff twice a day - rinse and spit - call if too expensive -             - to get rescue inhaler as needed - every 4 hours -- consider prior to exercise            -  consider PEPCID as needed for stomach GERD             - consider an exercise program -- cardipulmonary rehab by [employer] - OR consider walking 20min daily and progress!            - see me in 4 months     Dilia López MD  Pulmonary Medicine  3/5/2024

## 2024-03-25 RX ORDER — FOLIC ACID 1 MG/1
1 TABLET ORAL DAILY
Qty: 90 TABLET | Refills: 2 | Status: SHIPPED | OUTPATIENT
Start: 2024-03-25

## 2024-04-20 DIAGNOSIS — E11.22 TYPE 2 DM WITH CKD STAGE 3 AND HYPERTENSION (HCC): ICD-10-CM

## 2024-04-20 DIAGNOSIS — N18.30 TYPE 2 DM WITH CKD STAGE 3 AND HYPERTENSION (HCC): ICD-10-CM

## 2024-04-20 DIAGNOSIS — I12.9 TYPE 2 DM WITH CKD STAGE 3 AND HYPERTENSION (HCC): ICD-10-CM

## 2024-04-20 DIAGNOSIS — Z82.49 FAMILY HISTORY OF EARLY CAD: ICD-10-CM

## 2024-04-22 RX ORDER — ROSUVASTATIN CALCIUM 10 MG/1
10 TABLET, COATED ORAL NIGHTLY
Qty: 90 TABLET | Refills: 0 | Status: SHIPPED | OUTPATIENT
Start: 2024-04-22

## 2024-04-23 ENCOUNTER — LAB ENCOUNTER (OUTPATIENT)
Dept: LAB | Age: 69
End: 2024-04-23
Attending: FAMILY MEDICINE
Payer: MEDICARE

## 2024-04-23 DIAGNOSIS — E11.22 TYPE 2 DM WITH CKD STAGE 3 AND HYPERTENSION (HCC): ICD-10-CM

## 2024-04-23 DIAGNOSIS — E78.00 ELEVATED CHOLESTEROL: ICD-10-CM

## 2024-04-23 DIAGNOSIS — E53.8 VITAMIN B12 DEFICIENCY: ICD-10-CM

## 2024-04-23 DIAGNOSIS — R73.9 HYPERGLYCEMIA: ICD-10-CM

## 2024-04-23 DIAGNOSIS — I10 HYPERTENSION, UNSPECIFIED TYPE: ICD-10-CM

## 2024-04-23 DIAGNOSIS — I12.9 TYPE 2 DM WITH CKD STAGE 3 AND HYPERTENSION (HCC): ICD-10-CM

## 2024-04-23 DIAGNOSIS — E55.9 VITAMIN D DEFICIENCY: ICD-10-CM

## 2024-04-23 DIAGNOSIS — E53.8 FOLIC ACID DEFICIENCY: ICD-10-CM

## 2024-04-23 DIAGNOSIS — N18.30 TYPE 2 DM WITH CKD STAGE 3 AND HYPERTENSION (HCC): ICD-10-CM

## 2024-04-23 DIAGNOSIS — E83.110 HEREDITARY HEMOCHROMATOSIS (HCC): ICD-10-CM

## 2024-04-23 LAB
ALBUMIN SERPL-MCNC: 3.9 G/DL (ref 3.4–5)
ALBUMIN/GLOB SERPL: 1.1 {RATIO} (ref 1–2)
ALP LIVER SERPL-CCNC: 107 U/L
ALT SERPL-CCNC: 39 U/L
ANION GAP SERPL CALC-SCNC: 5 MMOL/L (ref 0–18)
AST SERPL-CCNC: 23 U/L (ref 15–37)
BILIRUB SERPL-MCNC: 1.9 MG/DL (ref 0.1–2)
BUN BLD-MCNC: 7 MG/DL (ref 9–23)
CALCIUM BLD-MCNC: 10.1 MG/DL (ref 8.5–10.1)
CHLORIDE SERPL-SCNC: 104 MMOL/L (ref 98–112)
CHOLEST SERPL-MCNC: 184 MG/DL (ref ?–200)
CO2 SERPL-SCNC: 28 MMOL/L (ref 21–32)
CREAT BLD-MCNC: 0.9 MG/DL
EGFRCR SERPLBLD CKD-EPI 2021: 70 ML/MIN/1.73M2 (ref 60–?)
EST. AVERAGE GLUCOSE BLD GHB EST-MCNC: 114 MG/DL (ref 68–126)
FASTING PATIENT LIPID ANSWER: YES
FASTING STATUS PATIENT QL REPORTED: YES
FOLATE SERPL-MCNC: 19.8 NG/ML (ref 8.7–?)
GLOBULIN PLAS-MCNC: 3.7 G/DL (ref 2.8–4.4)
GLUCOSE BLD-MCNC: 116 MG/DL (ref 70–99)
HBA1C MFR BLD: 5.6 % (ref ?–5.7)
HDLC SERPL-MCNC: 49 MG/DL (ref 40–59)
LDLC SERPL CALC-MCNC: 108 MG/DL (ref ?–100)
NONHDLC SERPL-MCNC: 135 MG/DL (ref ?–130)
OSMOLALITY SERPL CALC.SUM OF ELEC: 283 MOSM/KG (ref 275–295)
POTASSIUM SERPL-SCNC: 3.8 MMOL/L (ref 3.5–5.1)
PROT SERPL-MCNC: 7.6 G/DL (ref 6.4–8.2)
SODIUM SERPL-SCNC: 137 MMOL/L (ref 136–145)
TRIGL SERPL-MCNC: 156 MG/DL (ref 30–149)
VLDLC SERPL CALC-MCNC: 27 MG/DL (ref 0–30)

## 2024-04-23 PROCEDURE — 82746 ASSAY OF FOLIC ACID SERUM: CPT

## 2024-04-23 PROCEDURE — 83036 HEMOGLOBIN GLYCOSYLATED A1C: CPT

## 2024-04-23 PROCEDURE — 80053 COMPREHEN METABOLIC PANEL: CPT

## 2024-04-23 PROCEDURE — 80061 LIPID PANEL: CPT

## 2024-04-23 PROCEDURE — 36415 COLL VENOUS BLD VENIPUNCTURE: CPT

## 2024-04-25 ENCOUNTER — LAB ENCOUNTER (OUTPATIENT)
Dept: LAB | Age: 69
End: 2024-04-25
Attending: FAMILY MEDICINE
Payer: MEDICARE

## 2024-04-25 ENCOUNTER — OFFICE VISIT (OUTPATIENT)
Dept: FAMILY MEDICINE CLINIC | Facility: CLINIC | Age: 69
End: 2024-04-25
Payer: MEDICARE

## 2024-04-25 VITALS
DIASTOLIC BLOOD PRESSURE: 78 MMHG | RESPIRATION RATE: 20 BRPM | BODY MASS INDEX: 31.41 KG/M2 | HEIGHT: 64 IN | WEIGHT: 184 LBS | HEART RATE: 61 BPM | SYSTOLIC BLOOD PRESSURE: 136 MMHG | OXYGEN SATURATION: 98 %

## 2024-04-25 DIAGNOSIS — Z00.00 ENCOUNTER FOR ANNUAL HEALTH EXAMINATION: Primary | ICD-10-CM

## 2024-04-25 DIAGNOSIS — Z87.891 HX OF TOBACCO USE, PRESENTING HAZARDS TO HEALTH: ICD-10-CM

## 2024-04-25 DIAGNOSIS — M72.0 DUPUYTREN'S CONTRACTURE: ICD-10-CM

## 2024-04-25 DIAGNOSIS — J41.0 SMOKERS' COUGH (HCC): Chronic | ICD-10-CM

## 2024-04-25 DIAGNOSIS — F98.8 ATTENTION DEFICIT DISORDER (ADD) IN ADULT: ICD-10-CM

## 2024-04-25 DIAGNOSIS — F32.5 MAJOR DEPRESSIVE DISORDER WITH SINGLE EPISODE, IN FULL REMISSION (HCC): ICD-10-CM

## 2024-04-25 DIAGNOSIS — N18.30 TYPE 2 DM WITH CKD STAGE 3 AND HYPERTENSION (HCC): ICD-10-CM

## 2024-04-25 DIAGNOSIS — R53.83 OTHER FATIGUE: ICD-10-CM

## 2024-04-25 DIAGNOSIS — I10 HYPERTENSION, UNSPECIFIED TYPE: ICD-10-CM

## 2024-04-25 DIAGNOSIS — M25.50 ARTHRALGIA, UNSPECIFIED JOINT: ICD-10-CM

## 2024-04-25 DIAGNOSIS — D58.2 ELEVATED HEMOGLOBIN (HCC): ICD-10-CM

## 2024-04-25 DIAGNOSIS — Z87.891 FORMER TOBACCO USE: ICD-10-CM

## 2024-04-25 DIAGNOSIS — Z12.31 ENCOUNTER FOR SCREENING MAMMOGRAM FOR HIGH-RISK PATIENT: ICD-10-CM

## 2024-04-25 DIAGNOSIS — E55.9 VITAMIN D DEFICIENCY: ICD-10-CM

## 2024-04-25 DIAGNOSIS — E83.110 HEREDITARY HEMOCHROMATOSIS (HCC): ICD-10-CM

## 2024-04-25 DIAGNOSIS — Z12.2 SCREENING FOR LUNG CANCER: ICD-10-CM

## 2024-04-25 DIAGNOSIS — E78.00 ELEVATED CHOLESTEROL: ICD-10-CM

## 2024-04-25 DIAGNOSIS — F41.9 ANXIETY: ICD-10-CM

## 2024-04-25 DIAGNOSIS — E11.22 TYPE 2 DM WITH CKD STAGE 3 AND HYPERTENSION (HCC): ICD-10-CM

## 2024-04-25 DIAGNOSIS — I12.9 TYPE 2 DM WITH CKD STAGE 3 AND HYPERTENSION (HCC): ICD-10-CM

## 2024-04-25 DIAGNOSIS — D22.9 ATYPICAL NEVI: ICD-10-CM

## 2024-04-25 PROBLEM — R06.02 SOB (SHORTNESS OF BREATH): Status: RESOLVED | Noted: 2023-10-04 | Resolved: 2024-04-25

## 2024-04-25 PROBLEM — R06.89 DYSPNEA AND RESPIRATORY ABNORMALITIES: Status: RESOLVED | Noted: 2023-10-30 | Resolved: 2024-04-25

## 2024-04-25 PROBLEM — R06.00 DYSPNEA AND RESPIRATORY ABNORMALITIES: Status: RESOLVED | Noted: 2023-10-30 | Resolved: 2024-04-25

## 2024-04-25 LAB
CRP SERPL-MCNC: <0.29 MG/DL (ref ?–0.3)
ERYTHROCYTE [SEDIMENTATION RATE] IN BLOOD: 13 MM/HR
RHEUMATOID FACT SERPL-ACNC: <10 IU/ML (ref ?–15)
VIT D+METAB SERPL-MCNC: 58.6 NG/ML (ref 30–100)

## 2024-04-25 PROCEDURE — 86431 RHEUMATOID FACTOR QUANT: CPT

## 2024-04-25 PROCEDURE — 86038 ANTINUCLEAR ANTIBODIES: CPT

## 2024-04-25 PROCEDURE — 36415 COLL VENOUS BLD VENIPUNCTURE: CPT

## 2024-04-25 PROCEDURE — 86225 DNA ANTIBODY NATIVE: CPT

## 2024-04-25 PROCEDURE — 82306 VITAMIN D 25 HYDROXY: CPT

## 2024-04-25 PROCEDURE — 85652 RBC SED RATE AUTOMATED: CPT

## 2024-04-25 PROCEDURE — 86140 C-REACTIVE PROTEIN: CPT

## 2024-04-25 RX ORDER — SEMAGLUTIDE 1.34 MG/ML
INJECTION, SOLUTION SUBCUTANEOUS
COMMUNITY
Start: 2024-02-27

## 2024-04-25 NOTE — PATIENT INSTRUCTIONS
Kaley Thorpe's SCREENING SCHEDULE   Tests on this list are recommended by your physician but may not be covered, or covered at this frequency, by your insurer.   Please check with your insurance carrier before scheduling to verify coverage.   PREVENTATIVE SERVICES FREQUENCY &  COVERAGE DETAILS LAST COMPLETION DATE   Diabetes Screening    Fasting Blood Sugar /  Glucose    One screening every 12 months if never tested or if previously tested but not diagnosed with pre-diabetes   One screening every 6 months if diagnosed with pre-diabetes Lab Results   Component Value Date    GLUCOSE 116 (H) 12/08/2015     (H) 04/23/2024        Cardiovascular Disease Screening    Lipid Panel  Cholesterol  Lipoprotein (HDL)  Triglycerides Covered every 5 years for all Medicare beneficiaries without apparent signs or symptoms of cardiovascular disease Lab Results   Component Value Date    CHOLEST 184 04/23/2024    HDL 49 04/23/2024     (H) 04/23/2024    TRIG 156 (H) 04/23/2024         Electrocardiogram (EKG)   Covered if needed at Welcome to Medicare, and non-screening if indicated for medical reasons 09/28/2023      Ultrasound Screening for Abdominal Aortic Aneurysm (AAA) Covered once in a lifetime for one of the following risk factors   • Men who are 65-75 years old and have ever smoked   • Anyone with a family history -     Colorectal Cancer Screening  Covered for ages 50-85; only need ONE of the following:    Colonoscopy   Covered every 10 years    Covered every 2 years if patient is at high risk or previous colonoscopy was abnormal -    Health Maintenance   Topic Date Due   • Colorectal Cancer Screening  10/11/2026       Flexible Sigmoidoscopy   Covered every 4 years -    Fecal Occult Blood Test Covered annually -   Bone Density Screening    Bone density screening    Covered every 2 years after age 65 if diagnosed with risk of osteoporosis or estrogen deficiency.    Covered yearly for long-term glucocorticoid  medication use (Steroids) Last Dexa Scan:    XR DEXA BONE DENSITOMETRY (CPT=77080) 05/10/2023      No recommendations at this time   Pap and Pelvic    Pap   Covered every 2 years for women at normal risk; Annually if at high risk 03/11/2020  No recommendations at this time    Chlamydia Annually if high risk -  No recommendations at this time   Screening Mammogram    Mammogram     Recommend annually for all female patients aged 40 and older    One baseline mammogram covered for patients aged 35-39 11/27/2023    Health Maintenance   Topic Date Due   • Mammogram  11/27/2024       Immunizations    Influenza Covered once per flu season  Please get every year 12/29/2023  No recommendations at this time    Pneumococcal Each vaccine ([id number] & [id number]) covered once after 65 Prevnar 13: -    [id number]: -     No recommendations at this time    Hepatitis B One screening covered for patients with certain risk factors   -  No recommendations at this time    Tetanus Toxoid Not covered by Medicare Part B unless medically necessary (cut with metal); may be covered with your pharmacy prescription benefits -    Tetanus, Diptheria and Pertusis TD and TDaP Not covered by Medicare Part B -  No recommendations at this time    Zoster Not covered by Medicare Part B; may be covered with your pharmacy  prescription benefits 02/02/2016  No recommendations at this time     Annual Monitoring of Persistent Medications (ACE/ARB, digoxin diuretics, anticonvulsants)    Potassium Annually Lab Results   Component Value Date    K 3.8 04/23/2024         Creatinine   Annually Lab Results   Component Value Date    CREATSERUM 0.90 04/23/2024         BUN Annually Lab Results   Component Value Date    BUN 7 (L) 04/23/2024       Drug Serum Conc Annually No results found for: \"DIGOXIN\", \"DIG\", \"VALP\"         Chronic Obstructive Pulmonary Disease (COPD)    Spirometry Annually Spirometry date: 10/04/2023

## 2024-04-25 NOTE — PROGRESS NOTES
Subjective:   Kaley Thorpe is a 68 year old female who presents for a MA (Medicare Advantage) Supervisit (Once per calendar year) and scheduled follow up of multiple significant but stable problems.   Pt also here with    Anxiety    Attention deficit disorder (ADD) in adult    Major depressive disorder with single episode, in full remission (HCC)    Elevated cholesterol    Dupuytren's contracture    Other fatigue    Type 2 DM with CKD stage 3 and hypertension (HCC)    Elevated hemoglobin (HCC)    Smokers' cough (HCC)    Hereditary hemochromatosis (HCC)    Hypertension    Former tobacco use         History/Other:   Fall Risk Assessment:   She has been screened for Falls and is High Risk. Fall Prevention information provided to patient in After Visit Summary.    Do you feel unsteady when standing or walking?: Yes  Do you worry about falling?: No  Have you fallen in the past year?: No     Cognitive Assessment:   She had a completely normal cognitive assessment - see flowsheet entries     Functional Ability/Status:   Kaley Thorpe has some abnormal functions as listed below:  She has Hearing problems based on screening of functional status.She has Vision problems based on screening of functional status.       Depression Screening (PHQ-2/PHQ-9): PHQ-2 SCORE: 0  , done 4/25/2024        Advanced Directives:   She does NOT have a Living Will. [Do you have a living will?: No]  She does NOT have a Power of  for Health Care. [Do you have a healthcare power of ?: No]  Discussed Advance Care Planning with patient (and family/surrogate if present). Standard forms made available to patient in After Visit Summary.      Patient Active Problem List   Diagnosis    Anxiety-stable     Attention deficit disorder (ADD) in adult- stable     Major depressive disorder with single episode, in full remission (HCC)- stable     Elevated cholesterol- stable     Dupuytren's contracture- stable     Other fatigue- stable      Type 2 DM with CKD stage 3 and hypertension (HCC)- stable     Elevated hemoglobin (HCC)- stable     Smokers' cough (HCC)- stable     Hereditary hemochromatosis (HCC)- stable     Hypertension- stable     Former tobacco use- stable      Allergies:  She is allergic to topamax [topiramate] and morphine.    Current Medications:  Outpatient Medications Marked as Taking for the 4/25/24 encounter (Office Visit) with Tarah Cardenas DO   Medication Sig    OZEMPIC, 1 MG/DOSE, 4 MG/3ML Subcutaneous Solution Pen-injector     rosuvastatin 10 MG Oral Tab Take 1 tablet (10 mg total) by mouth nightly.    folic acid 1 MG Oral Tab Take 1 tablet (1 mg total) by mouth daily.    albuterol 108 (90 Base) MCG/ACT Inhalation Aero Soln Inhale 2 puffs into the lungs every 4 (four) hours as needed. inhale 2 puff by inhalation route  every 4 - 6 hours as needed    lisdexamfetamine 50 MG Oral Cap Take 1 capsule (50 mg total) by mouth every morning.    zolpidem 10 MG Oral Tab Take 1 tablet (10 mg total) by mouth nightly as needed. AT BEDTIME    LOSARTAN 25 MG Oral Tab TAKE 1 TABLET (25 MG TOTAL) BY MOUTH DAILY.    fluticasone-salmeterol 250-50 MCG/ACT Inhalation Aerosol Powder, Breath Activated Inhale 1 puff into the lungs 2 (two) times daily. inhale 1 puff by INHALATION route 2 times every day morning and evening approximately 12 hours apart    fluocinonide 0.05 % External Cream Apply bid For 10-14 days    aspirin 81 MG Oral Tab EC Take 1 tablet (81 mg total) by mouth daily.    REXULTI 4 MG Oral Tab Take 1 tablet by mouth daily.    VYVANSE 60 MG Oral Cap Take 1 capsule (60 mg total) by mouth every morning.    ibuprofen 200 MG Oral Tab Take 1 tablet (200 mg total) by mouth every 6 (six) hours as needed for Pain.    Calcium Carbonate Antacid (OSCAR-SELTZER ANTACID OR) Take by mouth.    metFORMIN  MG Oral Tablet 24 Hr Take 1 tablet (500 mg total) by mouth daily.    cyanocobalamine 1000 MCG Oral Tab Take 1 tablet (1,000 mcg total) by mouth  daily.    amphetamine-dextroamphetamine 20 MG Oral Tab Take 1 tablet (20 mg total) by mouth daily.    FLUTICASONE PROPIONATE 50 MCG/ACT Nasal Suspension SHAKE LIQUID AND USE 1 SPRAY IN EACH NOSTRIL DAILY (Patient taking differently: as needed.)    FLUoxetine HCl 20 MG Oral Tab Take 1 tablet (20 mg total) by mouth daily.       Medical History:  She  has a past medical history of Allergic rhinitis (April-), Anxiety, Asthma (), Attention deficit hyperactivity disorder (ADHD), Depression, High blood pressure, and Visual impairment.  Surgical History:  She  has a past surgical history that includes needle biopsy right (Right, 2020); ; Fracture surgery (Left); cholecystectomy; and  ().   Family History:  Her family history includes Breast Cancer in her paternal cousin female; Cancer in her father and mother.  Social History:  She  reports that she quit smoking about 14 years ago. Her smoking use included cigarettes. She started smoking about 44 years ago. She has a 30 pack-year smoking history. She has never used smokeless tobacco. She reports current alcohol use of about 2.0 standard drinks of alcohol per week. She reports current drug use. Drug: Cannabis.    Tobacco:  She smoked tobacco in the past but quit greater than 12 months ago.  Social History     Tobacco Use   Smoking Status Former    Current packs/day: 0.00    Average packs/day: 1 pack/day for 30.0 years (30.0 ttl pk-yrs)    Types: Cigarettes    Start date: 1980    Quit date: 2010    Years since quittin.3   Smokeless Tobacco Never          CAGE Alcohol Screen:   She has been screened for alcohol abuse and her score is not 0:  Cut: Have you ever felt you should Cut down on your drinking?: Yes  Annoyed: Have people Annoyed you by criticizing your drinking?: No  Guilty: Have you ever felt bad or Guilty about your drinking?: No  Eye Opener: Have you ever had a drink first thing in the morning to steady your nerves or to get  rid of a hangover (Eye opener)?: No  Total Score: 1    Patient Care Team:  Tarah Cardenas DO as PCP - General (Family Medicine)  Caleb Lakhani, PT as Physical Therapist    Review of Systems  GENERAL: feels well otherwise  SKIN: denies any unusual skin lesions  EYES: denies blurred vision or double vision  HEENT: denies nasal congestion, sinus pain or ST  LUNGS: denies shortness of breath with exertion  CARDIOVASCULAR: denies chest pain on exertion  GI: denies abdominal pain, denies heartburn  : denies dysuria, vaginal discharge or itching, no complaint of urinary incontinence   MUSCULOSKELETAL: denies back pain  NEURO: denies headaches  PSYCHE: denies depression or anxiety  HEMATOLOGIC: denies hx of anemia  ENDOCRINE: denies thyroid history  ALL/ASTHMA: denies  asthma    Objective:   Physical Exam  General Appearance:  Alert, cooperative, no distress, appears stated age   Head:  Normocephalic, without obvious abnormality, atraumatic   Eyes:  PERRL, conjunctiva/corneas clear, EOM's intact both eyes   Ears:  Normal TM's and external ear canals, both ears   Nose: Nares normal, septum midline,mucosa normal, no drainage or sinus tenderness   Throat: Lips, mucosa, and tongue normal; teeth and gums normal   Neck: Supple, symmetrical, trachea midline, no adenopathy;  thyroid: not enlarged, symmetric, no tenderness/mass/nodules; no carotid bruit or JVD   Back:   Symmetric, no curvature, ROM normal, no CVA tenderness   Lungs:   Clear to auscultation bilaterally, respirations unlabored   Heart:  Regular rate and rhythm, S1 and S2 normal, no murmur, rub, or gallop   Abdomen:   Soft, non-tender, bowel sounds active all four quadrants,  no masses, no organomegaly   Pelvic: Deferred   Extremities: Extremities normal, atraumatic, no cyanosis or edema   Pulses: 2+ and symmetric   Skin: Skin color, texture, turgor normal, no rashes or lesions   Lymph nodes: Cervical, supraclavicular, and axillary nodes normal   Neurologic: Normal        /78   Pulse 61   Resp 20   Ht 5' 4\" (1.626 m)   Wt 184 lb (83.5 kg)   SpO2 98%   BMI 31.58 kg/m²  Estimated body mass index is 31.58 kg/m² as calculated from the following:    Height as of this encounter: 5' 4\" (1.626 m).    Weight as of this encounter: 184 lb (83.5 kg).    Medicare Hearing Assessment: whispered voice     Visual Acuity:   Right Eye Visual Acuity: Uncorrected Right Eye Chart Acuity: 20/40   Left Eye Visual Acuity: Uncorrected Left Eye Chart Acuity: 20/40   Both Eyes Visual Acuity: Uncorrected Both Eyes Chart Acuity: 20/40   Able To Tolerate Visual Acuity: Yes        Assessment & Plan:   Kaley Thorpe is a 68 year old female who presents for a Medicare Assessment.     1. Encounter for annual health examination (Primary)  2. Type 2 DM with CKD stage 3 and hypertension (HCC)- stable monitor   -     Detailed, Mod Complex (00986)  3. Smokers' cough (HCC)- stable monitor   -     Detailed, Mod Complex (97996)  4. Major depressive disorder with single episode, in full remission (HCC)- stable monitor   -     Detailed, Mod Complex (54182)  5. Hereditary hemochromatosis (HCC)- see hematology   -     Detailed, Mod Complex (06392)  6. Elevated hemoglobin (HCC)- see hematology   -     Detailed, Mod Complex (61321)  7. Hypertension, unspecified type- stable cpm   -     Detailed, Mod Complex (85077)  8. Elevated cholesterol- stable cpm   -     Detailed, Mod Complex (73981)  9. Attention deficit disorder (ADD) in adult- see psych  -     Detailed, Mod Complex (64528)  10. Anxiety- see psych   -     Detailed, Mod Complex (67498)  11. Dupuytren's contracture- stable monitor   -     Detailed, Mod Complex (45017)  12. Other fatigue- stable monitor   -     Detailed, Mod Complex (85885)  13. Former tobacco use- stable monitor   -     Detailed, Mod Complex (37916)  14. Arthralgia, unspecified joint- check labs   -     C-Reactive Protein; Future; Expected date: 04/25/2024  -     Sed Adelaida Henderson  (Automated); Future; Expected date: 04/25/2024  -     Rheumatoid Arthritis Factor; Future; Expected date: 04/25/2024  -     Connective Tissue Disease (DOROTHY) Screen, Reflex Specific Antibody; Future; Expected date: 04/25/2024  -     Detailed, Mod Complex (62945)  15. Vitamin D deficiency- stable monitor   -     Vitamin D; Future; Expected date: 04/25/2024  -     Detailed, Mod Complex (92500)  16. Encounter for screening mammogram for high-risk patient  -     [facility] JUAREZ 2D+3D SCREENING BILAT (CPT=77067/18656); Future; Expected date: 04/25/2024  -     Detailed, Mod Complex (10906)  17. Screening for lung cancer- check lung CT   -     Detailed, Mod Complex (91501)  -     Subsequent Asymptomatic Screening for Lung Cancer CT, Shared Decision-making and Counseling done today, Follow up exam 12+ months later; Future; Expected date: 04/25/2024  18. Hx of tobacco use, presenting hazards to health- check lung CT   -     Detailed, Mod Complex (04874)  -     Subsequent Asymptomatic Screening for Lung Cancer CT, Shared Decision-making and Counseling done today, Follow up exam 12+ months later; Future; Expected date: 04/25/2024  19. Atypical nevi- see derm   -     Derm Referral - In Network    The patient indicates understanding of these issues and agrees to the plan.  Reinforced healthy diet, lifestyle, and exercise.    Return in 6 months (on 10/25/2024).     Tarah Cardenas DO, 4/25/2024     Supplementary Documentation:   General Health:  In the past six months, have you lost more than 10 pounds without trying?: 2 - No  Has your appetite been poor?: No  Type of Diet: Balanced  How does the patient maintain a good energy level?: Other  How would you describe your daily physical activity?: Light  How would you describe your current health state?: Fair  How do you maintain positive mental well-being?: Social Interaction  On a scale of 0 to 10, with 0 being no pain and 10 being severe pain, what is your pain level?: 7 - (Severe)  In the  past six months, have you experienced urine leakage?: 1-Yes  At any time do you feel concerned for the safety/well-being of yourself and/or your children, in your home or elsewhere?: No  Have you had any immunizations at another office such as Influenza, Hepatitis B, Tetanus, or Pneumococcal?: Yes         Kaley Thorpe's SCREENING SCHEDULE   Tests on this list are recommended by your physician but may not be covered, or covered at this frequency, by your insurer.   Please check with your insurance carrier before scheduling to verify coverage.   PREVENTATIVE SERVICES FREQUENCY &  COVERAGE DETAILS LAST COMPLETION DATE   Diabetes Screening    Fasting Blood Sugar /  Glucose    One screening every 12 months if never tested or if previously tested but not diagnosed with pre-diabetes   One screening every 6 months if diagnosed with pre-diabetes Lab Results   Component Value Date    GLUCOSE 116 (H) 12/08/2015     (H) 04/23/2024        Cardiovascular Disease Screening    Lipid Panel  Cholesterol  Lipoprotein (HDL)  Triglycerides Covered every 5 years for all Medicare beneficiaries without apparent signs or symptoms of cardiovascular disease Lab Results   Component Value Date    CHOLEST 184 04/23/2024    HDL 49 04/23/2024     (H) 04/23/2024    TRIG 156 (H) 04/23/2024         Electrocardiogram (EKG)   Covered if needed at Welcome to Medicare, and non-screening if indicated for medical reasons 09/28/2023      Ultrasound Screening for Abdominal Aortic Aneurysm (AAA) Covered once in a lifetime for one of the following risk factors    Men who are 65-75 years old and have ever smoked    Anyone with a family history -     Colorectal Cancer Screening  Covered for ages 50-85; only need ONE of the following:    Colonoscopy   Covered every 10 years    Covered every 2 years if patient is at high risk or previous colonoscopy was abnormal -    Health Maintenance   Topic Date Due    Colorectal Cancer Screening  10/11/2026        Flexible Sigmoidoscopy   Covered every 4 years -    Fecal Occult Blood Test Covered annually -   Bone Density Screening    Bone density screening    Covered every 2 years after age 65 if diagnosed with risk of osteoporosis or estrogen deficiency.    Covered yearly for long-term glucocorticoid medication use (Steroids) Last Dexa Scan:    XR DEXA BONE DENSITOMETRY (CPT=77080) 05/10/2023      No recommendations at this time   Pap and Pelvic    Pap   Covered every 2 years for women at normal risk; Annually if at high risk 03/11/2020  No recommendations at this time    Chlamydia Annually if high risk -  No recommendations at this time   Screening Mammogram    Mammogram     Recommend annually for all female patients aged 40 and older    One baseline mammogram covered for patients aged 35-39 11/27/2023    Health Maintenance   Topic Date Due    Mammogram  11/27/2024       Immunizations    Influenza Covered once per flu season  Please get every year 12/29/2023  No recommendations at this time    Pneumococcal Each vaccine ([id number] & Gtilkvows19) covered once after 65 Prevnar 13: -    [id number]: -     No recommendations at this time    Hepatitis B One screening covered for patients with certain risk factors   -  No recommendations at this time    Tetanus Toxoid Not covered by Medicare Part B unless medically necessary (cut with metal); may be covered with your pharmacy prescription benefits -    Tetanus, Diptheria and Pertusis TD and TDaP Not covered by Medicare Part B -  No recommendations at this time    Zoster Not covered by Medicare Part B; may be covered with your pharmacy  prescription benefits 02/02/2016  No recommendations at this time     Annual Monitoring of Persistent Medications (ACE/ARB, digoxin diuretics, anticonvulsants)    Potassium Annually Lab Results   Component Value Date    K 3.8 04/23/2024         Creatinine   Annually Lab Results   Component Value Date    CREATSERUM 0.90 04/23/2024         BUN  Annually Lab Results   Component Value Date    BUN 7 (L) 04/23/2024       Drug Serum Conc Annually No results found for: \"DIGOXIN\", \"DIG\", \"VALP\"           Chronic Obstructive Pulmonary Disease (COPD)    Spirometry Annually Spirometry date: 10/04/2023

## 2024-04-29 LAB
DSDNA IGG SERPL IA-ACNC: 1.7 IU/ML
ENA AB SER QL IA: 0.3 UG/L
ENA AB SER QL IA: NEGATIVE

## 2024-07-13 DIAGNOSIS — I12.9 TYPE 2 DM WITH CKD STAGE 3 AND HYPERTENSION (HCC): ICD-10-CM

## 2024-07-13 DIAGNOSIS — N18.30 TYPE 2 DM WITH CKD STAGE 3 AND HYPERTENSION (HCC): ICD-10-CM

## 2024-07-13 DIAGNOSIS — Z82.49 FAMILY HISTORY OF EARLY CAD: ICD-10-CM

## 2024-07-13 DIAGNOSIS — E11.22 TYPE 2 DM WITH CKD STAGE 3 AND HYPERTENSION (HCC): ICD-10-CM

## 2024-07-13 RX ORDER — ROSUVASTATIN CALCIUM 10 MG/1
10 TABLET, COATED ORAL NIGHTLY
Qty: 90 TABLET | Refills: 0 | Status: SHIPPED | OUTPATIENT
Start: 2024-07-13

## 2024-08-02 DIAGNOSIS — I10 HYPERTENSION, UNSPECIFIED TYPE: ICD-10-CM

## 2024-08-02 RX ORDER — LOSARTAN POTASSIUM 25 MG/1
25 TABLET ORAL DAILY
Qty: 90 TABLET | Refills: 1 | Status: SHIPPED | OUTPATIENT
Start: 2024-08-02

## 2024-08-27 NOTE — PROGRESS NOTES
[facility] Progress Note      Patient Name:  Kaley Thorpe  YOB: 1955  Medical Record Number:  [id number]    Date of visit:  8/28/2024    CHIEF COMPLAINT: Hemochromatosis.    HPI:     69 year old that I have followed since 1/23 for polycythemia which has been noted since 2017.  W/u- hereditary hemochromatosis with heterozygous H63D mutation.  Therapeutic phlebotomy recommended.  Presents for evaluation.    Phlebotomy last 6/24. Mild jt pain, body ache. Intentional wt loss with meds.     SOCIAL HISTORY:    .     ROS:     Constitutional: no fever, chills fatigue,night sweats or weight loss  Neurologic: no headache, seizures, diplopia or weakness  Respiratory: no cough, SOB or hemoptysis  Cardiovascular: no chest pain, ankle swelling, MITCHELL  GI: no nausea, vomiting, diarrhea or BRBPR  Musculoskeletal: no body-ache or joint pain  Dermatologic: no alopecia, rash, pruritis  : no hematuria, dysuria or frequency  Psych: no confusion or depression   Heme: no easy bruising or bleeding     ALLERGIES:    Allergies   Allergen Reactions    Topamax [Topiramate] OTHER (SEE COMMENTS)     Dizziness / confusion     Morphine ITCHING       MEDICATIONS:    Current Outpatient Medications   Medication Sig Dispense Refill    OZEMPIC, 2 MG/DOSE, 8 MG/3ML Subcutaneous Solution Pen-injector       LOSARTAN 25 MG Oral Tab TAKE 1 TABLET (25 MG TOTAL) BY MOUTH DAILY. 90 tablet 1    ROSUVASTATIN 10 MG Oral Tab TAKE 1 TABLET BY MOUTH EVERY DAY AT NIGHT 90 tablet 0    folic acid 1 MG Oral Tab Take 1 tablet (1 mg total) by mouth daily. 90 tablet 2    albuterol 108 (90 Base) MCG/ACT Inhalation Aero Soln Inhale 2 puffs into the lungs every 4 (four) hours as needed. inhale 2 puff by inhalation route  every 4 - 6 hours as needed 1 each 11    zolpidem 10 MG Oral Tab Take 1 tablet (10 mg total) by mouth nightly as needed. AT BEDTIME      fluticasone-salmeterol 250-50 MCG/ACT Inhalation Aerosol Powder, Breath Activated  Inhale 1 puff into the lungs 2 (two) times daily. inhale 1 puff by INHALATION route 2 times every day morning and evening approximately 12 hours apart 1 each 5    fluocinonide 0.05 % External Cream Apply bid For 10-14 days 30 g 1    aspirin 81 MG Oral Tab EC Take 1 tablet (81 mg total) by mouth daily.      REXULTI 4 MG Oral Tab Take 1 tablet by mouth daily.      VYVANSE 60 MG Oral Cap Take 1 capsule (60 mg total) by mouth every morning.      ibuprofen 200 MG Oral Tab Take 1 tablet (200 mg total) by mouth every 6 (six) hours as needed for Pain.      Calcium Carbonate Antacid (OSCAR-SELTZER ANTACID OR) Take by mouth.      cyanocobalamine 1000 MCG Oral Tab Take 1 tablet (1,000 mcg total) by mouth daily.      amphetamine-dextroamphetamine 20 MG Oral Tab Take 1 tablet (20 mg total) by mouth daily.      FLUTICASONE PROPIONATE 50 MCG/ACT Nasal Suspension SHAKE LIQUID AND USE 1 SPRAY IN EACH NOSTRIL DAILY (Patient taking differently: as needed.) 1 Bottle 2    FLUoxetine HCl 20 MG Oral Tab Take 1 tablet (20 mg total) by mouth daily.  0       VITALS:  Height: --  Weight: 78.9 kg (174 lb) (1129)  BSA (Calculated - sq m): --  Pulse: 76 (1129)  BP: 129/82 (1129)  Temp: 97.7 °F (36.5 °C) (1129)  Do Not Use - Resp Rate: --  SpO2: 96 % (1129)    PS:  ECO    PHYSICAL EXAM:    General: alert and oriented x 3, not in acute distress.  HEENT: KATLYN, oropharynx  clear.  Neck: supple.  No JVD /adenopathy.  CVS: S1S2, regular  Rhythm, no murmurs.   Lungs: Clear to auscultation and percussion.  Abdomen: Soft, non tender, no hepato-splenomegaly.  Extremities:  No edema.  CNS: no focal deficit    Emotional well being: Patient's emotional well being was assessed.  No issues requiring acute psychosocial intervention were identified.     LABS:     Recent Results (from the past 24 hour(s))   CBC With Differential With Platelet    Collection Time: 24 11:29 AM   Result Value Ref Range    WBC 6.5 4.0 - 11.0  x10(3) uL    RBC 4.76 3.80 - 5.30 x10(6)uL    HGB 15.2 12.0 - 16.0 g/dL    HCT 44.0 35.0 - 48.0 %    .0 150.0 - 450.0 10(3)uL    MCV 92.4 80.0 - 100.0 fL    MCH 31.9 26.0 - 34.0 pg    MCHC 34.5 31.0 - 37.0 g/dL    RDW 12.6 %    Neutrophil Absolute Prelim 3.33 1.50 - 7.70 x10 (3) uL    Neutrophil Absolute 3.33 1.50 - 7.70 x10(3) uL    Lymphocyte Absolute 2.09 1.00 - 4.00 x10(3) uL    Monocyte Absolute 0.52 0.10 - 1.00 x10(3) uL    Eosinophil Absolute 0.43 0.00 - 0.70 x10(3) uL    Basophil Absolute 0.08 0.00 - 0.20 x10(3) uL    Immature Granulocyte Absolute 0.01 0.00 - 1.00 x10(3) uL    Neutrophil % 51.5 %    Lymphocyte % 32.4 %    Monocyte % 8.0 %    Eosinophil % 6.7 %    Basophil % 1.2 %    Immature Granulocyte % 0.2 %   Ferritin    Collection Time: 08/28/24 11:29 AM   Result Value Ref Range    Ferritin 46.6 (L) 50.0 - 306.0 ng/mL   Iron And Tibc    Collection Time: 08/28/24 11:29 AM   Result Value Ref Range    Iron 128 50 - 170 ug/dL    Transferrin 295 250 - 380 mg/dL    Total Iron Binding Capacity 362 250 - 425 ug/dL    % Saturation 35 15 - 50 %         ASSESSMENT AND PLAN:     # Hereditary hemochromatosis: Diag 1/23. Het H63D mutation.  On phlebotomy q 3 months.   Iron levels are at goal.  Recommend phlebotomy every 3 months for this year.      ORDERS PLACED:    Return for Labs 6 months. Labs, MD 1 yr.    Lelo Vaughn M.D.    [facility]    [facility]  [address] [place], [address]    8/28/2024         unknown

## 2024-08-28 ENCOUNTER — OFFICE VISIT (OUTPATIENT)
Dept: HEMATOLOGY/ONCOLOGY | Facility: HOSPITAL | Age: 69
End: 2024-08-28
Attending: INTERNAL MEDICINE
Payer: MEDICARE

## 2024-08-28 VITALS
OXYGEN SATURATION: 96 % | DIASTOLIC BLOOD PRESSURE: 82 MMHG | HEART RATE: 76 BPM | WEIGHT: 174 LBS | BODY MASS INDEX: 30 KG/M2 | SYSTOLIC BLOOD PRESSURE: 129 MMHG | TEMPERATURE: 98 F

## 2024-08-28 DIAGNOSIS — E83.110 HEREDITARY HEMOCHROMATOSIS (HCC): ICD-10-CM

## 2024-08-28 DIAGNOSIS — E53.8 FOLIC ACID DEFICIENCY: ICD-10-CM

## 2024-08-28 LAB
BASOPHILS # BLD AUTO: 0.08 X10(3) UL (ref 0–0.2)
BASOPHILS NFR BLD AUTO: 1.2 %
DEPRECATED HBV CORE AB SER IA-ACNC: 46.6 NG/ML
EOSINOPHIL # BLD AUTO: 0.43 X10(3) UL (ref 0–0.7)
EOSINOPHIL NFR BLD AUTO: 6.7 %
ERYTHROCYTE [DISTWIDTH] IN BLOOD BY AUTOMATED COUNT: 12.6 %
HCT VFR BLD AUTO: 44 %
HGB BLD-MCNC: 15.2 G/DL
IMM GRANULOCYTES # BLD AUTO: 0.01 X10(3) UL (ref 0–1)
IMM GRANULOCYTES NFR BLD: 0.2 %
IRON SATN MFR SERPL: 35 %
IRON SERPL-MCNC: 128 UG/DL
LYMPHOCYTES # BLD AUTO: 2.09 X10(3) UL (ref 1–4)
LYMPHOCYTES NFR BLD AUTO: 32.4 %
MCH RBC QN AUTO: 31.9 PG (ref 26–34)
MCHC RBC AUTO-ENTMCNC: 34.5 G/DL (ref 31–37)
MCV RBC AUTO: 92.4 FL
MONOCYTES # BLD AUTO: 0.52 X10(3) UL (ref 0.1–1)
MONOCYTES NFR BLD AUTO: 8 %
NEUTROPHILS # BLD AUTO: 3.33 X10 (3) UL (ref 1.5–7.7)
NEUTROPHILS # BLD AUTO: 3.33 X10(3) UL (ref 1.5–7.7)
NEUTROPHILS NFR BLD AUTO: 51.5 %
PLATELET # BLD AUTO: 233 10(3)UL (ref 150–450)
RBC # BLD AUTO: 4.76 X10(6)UL
TOTAL IRON BINDING CAPACITY: 362 UG/DL (ref 250–425)
TRANSFERRIN SERPL-MCNC: 295 MG/DL (ref 250–380)
WBC # BLD AUTO: 6.5 X10(3) UL (ref 4–11)

## 2024-08-28 PROCEDURE — 99214 OFFICE O/P EST MOD 30 MIN: CPT | Performed by: INTERNAL MEDICINE

## 2024-08-28 RX ORDER — SEMAGLUTIDE 2.68 MG/ML
INJECTION, SOLUTION SUBCUTANEOUS
COMMUNITY
Start: 2024-08-23

## 2024-08-28 NOTE — PROGRESS NOTES
Education Record    Learner:  Patient    Disease / Diagnosis: hemochromatosis     Barriers / Limitations:  None   Comments:    Method:  Discussion   Comments:    General Topics:  Plan of care reviewed   Comments:    Outcome:  Shows understanding   Comments:    Last phlebotomy 6/11/24, going every 3 months to [facility].   Reports joint pain that is kind of all over.   No change from her normal.

## 2024-10-05 DIAGNOSIS — N18.30 TYPE 2 DM WITH CKD STAGE 3 AND HYPERTENSION (HCC): ICD-10-CM

## 2024-10-05 DIAGNOSIS — I12.9 TYPE 2 DM WITH CKD STAGE 3 AND HYPERTENSION (HCC): ICD-10-CM

## 2024-10-05 DIAGNOSIS — E11.22 TYPE 2 DM WITH CKD STAGE 3 AND HYPERTENSION (HCC): ICD-10-CM

## 2024-10-05 DIAGNOSIS — Z82.49 FAMILY HISTORY OF EARLY CAD: ICD-10-CM

## 2024-10-07 RX ORDER — ROSUVASTATIN CALCIUM 10 MG/1
10 TABLET, COATED ORAL NIGHTLY
Qty: 90 TABLET | Refills: 0 | Status: SHIPPED | OUTPATIENT
Start: 2024-10-07

## 2024-12-03 ENCOUNTER — HOSPITAL ENCOUNTER (OUTPATIENT)
Dept: MAMMOGRAPHY | Facility: HOSPITAL | Age: 69
Discharge: HOME | End: 2024-12-03
Attending: FAMILY MEDICINE
Payer: MEDICARE

## 2024-12-03 ENCOUNTER — HOSPITAL ENCOUNTER (OUTPATIENT)
Dept: CT IMAGING | Facility: HOSPITAL | Age: 69
Discharge: HOME | End: 2024-12-03
Attending: FAMILY MEDICINE
Payer: MEDICARE

## 2024-12-03 DIAGNOSIS — Z12.31 ENCOUNTER FOR SCREENING MAMMOGRAM FOR HIGH-RISK PATIENT: ICD-10-CM

## 2024-12-03 DIAGNOSIS — Z12.2 SCREENING FOR LUNG CANCER: ICD-10-CM

## 2024-12-03 DIAGNOSIS — Z87.891 HX OF TOBACCO USE, PRESENTING HAZARDS TO HEALTH: ICD-10-CM

## 2024-12-03 PROCEDURE — 71271 CT THORAX LUNG CANCER SCR C-: CPT | Performed by: FAMILY MEDICINE

## 2024-12-03 PROCEDURE — 77063 BREAST TOMOSYNTHESIS BI: CPT | Performed by: FAMILY MEDICINE

## 2024-12-03 PROCEDURE — 77067 SCR MAMMO BI INCL CAD: CPT | Performed by: FAMILY MEDICINE

## 2025-01-19 DIAGNOSIS — I10 HYPERTENSION, UNSPECIFIED TYPE: ICD-10-CM

## 2025-01-20 RX ORDER — LOSARTAN POTASSIUM 25 MG/1
25 TABLET ORAL DAILY
Qty: 90 TABLET | Refills: 1 | Status: SHIPPED | OUTPATIENT
Start: 2025-01-20

## 2025-02-03 DIAGNOSIS — E83.110 HEREDITARY HEMOCHROMATOSIS: Primary | ICD-10-CM

## 2025-02-03 RX ORDER — FOLIC ACID 1 MG/1
1 TABLET ORAL DAILY
Qty: 90 TABLET | Refills: 0 | Status: SHIPPED | OUTPATIENT
Start: 2025-02-03

## 2025-03-04 DIAGNOSIS — N18.30 TYPE 2 DM WITH CKD STAGE 3 AND HYPERTENSION (HCC): ICD-10-CM

## 2025-03-04 DIAGNOSIS — I12.9 TYPE 2 DM WITH CKD STAGE 3 AND HYPERTENSION (HCC): ICD-10-CM

## 2025-03-04 DIAGNOSIS — Z82.49 FAMILY HISTORY OF EARLY CAD: ICD-10-CM

## 2025-03-04 DIAGNOSIS — E11.22 TYPE 2 DM WITH CKD STAGE 3 AND HYPERTENSION (HCC): ICD-10-CM

## 2025-03-04 RX ORDER — ROSUVASTATIN CALCIUM 10 MG/1
10 TABLET, COATED ORAL NIGHTLY
Qty: 90 TABLET | Refills: 0 | Status: SHIPPED | OUTPATIENT
Start: 2025-03-04

## 2025-03-13 ENCOUNTER — EKG ENCOUNTER (OUTPATIENT)
Dept: LAB | Age: 70
End: 2025-03-13
Attending: FAMILY MEDICINE
Payer: MEDICARE

## 2025-03-13 ENCOUNTER — OFFICE VISIT (OUTPATIENT)
Dept: FAMILY MEDICINE CLINIC | Facility: CLINIC | Age: 70
End: 2025-03-13
Payer: MEDICARE

## 2025-03-13 VITALS
SYSTOLIC BLOOD PRESSURE: 104 MMHG | RESPIRATION RATE: 16 BRPM | WEIGHT: 169 LBS | HEIGHT: 64 IN | DIASTOLIC BLOOD PRESSURE: 60 MMHG | OXYGEN SATURATION: 94 % | BODY MASS INDEX: 28.85 KG/M2 | HEART RATE: 74 BPM

## 2025-03-13 DIAGNOSIS — I12.9 TYPE 2 DM WITH CKD STAGE 3 AND HYPERTENSION (HCC): ICD-10-CM

## 2025-03-13 DIAGNOSIS — N18.30 TYPE 2 DM WITH CKD STAGE 3 AND HYPERTENSION (HCC): ICD-10-CM

## 2025-03-13 DIAGNOSIS — R55 SYNCOPE, UNSPECIFIED SYNCOPE TYPE: ICD-10-CM

## 2025-03-13 DIAGNOSIS — E83.110 HEREDITARY HEMOCHROMATOSIS: ICD-10-CM

## 2025-03-13 DIAGNOSIS — F98.8 ATTENTION DEFICIT DISORDER (ADD) IN ADULT: ICD-10-CM

## 2025-03-13 DIAGNOSIS — F32.5 MAJOR DEPRESSIVE DISORDER WITH SINGLE EPISODE, IN FULL REMISSION: ICD-10-CM

## 2025-03-13 DIAGNOSIS — E53.8 VITAMIN B12 DEFICIENCY: ICD-10-CM

## 2025-03-13 DIAGNOSIS — R06.02 SOB (SHORTNESS OF BREATH): ICD-10-CM

## 2025-03-13 DIAGNOSIS — D58.2 ELEVATED HEMOGLOBIN: ICD-10-CM

## 2025-03-13 DIAGNOSIS — E78.00 ELEVATED CHOLESTEROL: ICD-10-CM

## 2025-03-13 DIAGNOSIS — R53.83 OTHER FATIGUE: ICD-10-CM

## 2025-03-13 DIAGNOSIS — J41.0 SMOKERS' COUGH (HCC): Chronic | ICD-10-CM

## 2025-03-13 DIAGNOSIS — S09.90XA TRAUMATIC INJURY OF HEAD, INITIAL ENCOUNTER: ICD-10-CM

## 2025-03-13 DIAGNOSIS — G44.319 ACUTE POST-TRAUMATIC HEADACHE, NOT INTRACTABLE: ICD-10-CM

## 2025-03-13 DIAGNOSIS — F07.81 POSTCONCUSSION SYNDROME: ICD-10-CM

## 2025-03-13 DIAGNOSIS — F41.9 ANXIETY: ICD-10-CM

## 2025-03-13 DIAGNOSIS — E55.9 VITAMIN D DEFICIENCY: ICD-10-CM

## 2025-03-13 DIAGNOSIS — I10 HYPERTENSION, UNSPECIFIED TYPE: ICD-10-CM

## 2025-03-13 DIAGNOSIS — M72.0 DUPUYTREN'S CONTRACTURE: ICD-10-CM

## 2025-03-13 DIAGNOSIS — Z82.49 FAMILY HISTORY OF EARLY CAD: ICD-10-CM

## 2025-03-13 DIAGNOSIS — Z00.00 ENCOUNTER FOR ANNUAL HEALTH EXAMINATION: Primary | ICD-10-CM

## 2025-03-13 DIAGNOSIS — E11.22 TYPE 2 DM WITH CKD STAGE 3 AND HYPERTENSION (HCC): ICD-10-CM

## 2025-03-13 DIAGNOSIS — Z87.891 FORMER TOBACCO USE: ICD-10-CM

## 2025-03-13 PROCEDURE — 1126F AMNT PAIN NOTED NONE PRSNT: CPT | Performed by: FAMILY MEDICINE

## 2025-03-13 PROCEDURE — 1159F MED LIST DOCD IN RCRD: CPT | Performed by: FAMILY MEDICINE

## 2025-03-13 PROCEDURE — 96160 PT-FOCUSED HLTH RISK ASSMT: CPT | Performed by: FAMILY MEDICINE

## 2025-03-13 PROCEDURE — 1160F RVW MEDS BY RX/DR IN RCRD: CPT | Performed by: FAMILY MEDICINE

## 2025-03-13 PROCEDURE — 99499 UNLISTED E&M SERVICE: CPT | Performed by: FAMILY MEDICINE

## 2025-03-13 PROCEDURE — G0439 PPPS, SUBSEQ VISIT: HCPCS | Performed by: FAMILY MEDICINE

## 2025-03-13 PROCEDURE — 99214 OFFICE O/P EST MOD 30 MIN: CPT | Performed by: FAMILY MEDICINE

## 2025-03-13 RX ORDER — ROSUVASTATIN CALCIUM 10 MG/1
10 TABLET, COATED ORAL NIGHTLY
Qty: 90 TABLET | Refills: 1 | Status: SHIPPED | OUTPATIENT
Start: 2025-03-13

## 2025-03-13 NOTE — PROGRESS NOTES
Subjective:   Kaley Thorpe is a 69 year old female who presents for a MA AHA (Medicare Advantage Annual Health Assessment) and Subsequent Annual Wellness visit (Pt already had Initial Annual Wellness) and scheduled follow up of multiple significant but stable problems and acute complicated new problem.     Pt also here with    Anxiety    Attention deficit disorder (ADD) in adult    Major depressive disorder with single episode, in full remission    Elevated cholesterol    Dupuytren's contracture    Other fatigue    Type 2 DM with CKD stage 3 and hypertension (HCC)    Elevated hemoglobin    Smokers' cough (HCC)    Hereditary hemochromatosis    Hypertension    Former tobacco use       Pt reports she had a syncopal episode with LOC   Pt had some head trauma but did not seek ER eval   Pt feels she has had some concussions in the past   Pt has had a HA since her concussion     History/Other:   Fall Risk Assessment:   She has been screened for Falls and is High Risk. Fall Prevention information provided to patient in After Visit Summary.    Do you feel unsteady when standing or walking?: Yes (sometimes)  Do you worry about falling?: Yes  Have you fallen in the past year?: Yes  How many times have you fallen?: 1  Were you injured?: Yes     Cognitive Assessment:   She had a completely normal cognitive assessment - see flowsheet entries     Functional Ability/Status:   Kaley Thorpe has some abnormal functions as listed below:  She has Hearing problems based on screening of functional status.She has Vision problems based on screening of functional status. She has problems with Memory based on screening of functional status.       Depression Screening (PHQ):  PHQ-9 TOTAL SCORE: 14  , done 3/13/2025   Little interest or pleasure in doing things: 3    Feeling down, depressed, or hopeless: 3    Feeling tired or having little energy: 3    Feeling bad about yourself - or that you are a failure or have let yourself or  your family down: 1    Trouble concentrating on things, such as reading the newspaper or watching television: 3    Moving or speaking so slowly that other people could have noticed. Or the opposite - being so fidgety or restless that you have been moving around a lot more than usual: 1    If you checked off any problems, how difficult have these problems made it for you to do your work, take care of things at home, or get along with other people?: Somewhat difficult    Last [place] Suicide Screening on 3/13/2025 was No Risk.     Advanced Directives:   She does NOT have a Living Will. [Do you have a living will?: No]  She does NOT have a Power of  for Health Care. [Do you have a healthcare power of ?: No]  Discussed Advance Care Planning with patient (and family/surrogate if present). Standard forms made available to patient in After Visit Summary.      Patient Active Problem List   Diagnosis    Anxiety- stable     Attention deficit disorder (ADD) in adult- stable     Major depressive disorder with single episode, in full remission- stable / seeing psychiatrist regularly and aware of current situation per patient     Elevated cholesterol- stable     Dupuytren's contracture- stable     Other fatigue- stable     Type 2 DM with CKD stage 3 and hypertension (HCC)- stable     Elevated hemoglobin- stable     Smokers' cough (HCC)- stable     Hereditary hemochromatosis- stable     Hypertension- stable     Former tobacco use- stable      Allergies:  She is allergic to topamax [topiramate] and morphine.    Current Medications:  Outpatient Medications Marked as Taking for the 3/13/25 encounter (Office Visit) with Tarah Cardenas DO   Medication Sig    rosuvastatin 10 MG Oral Tab Take 1 tablet (10 mg total) by mouth nightly.    folic acid 1 MG Oral Tab TAKE 1 TABLET BY MOUTH EVERY DAY    OZEMPIC, 2 MG/DOSE, 8 MG/3ML Subcutaneous Solution Pen-injector     zolpidem 10 MG Oral Tab Take 1 tablet (10 mg total) by  mouth nightly as needed. AT BEDTIME    aspirin 81 MG Oral Tab EC Take 1 tablet (81 mg total) by mouth daily.    REXULTI 4 MG Oral Tab Take 1 tablet by mouth daily.    VYVANSE 60 MG Oral Cap Take 1 capsule (60 mg total) by mouth every morning.    ibuprofen 200 MG Oral Tab Take 1 tablet (200 mg total) by mouth every 6 (six) hours as needed for Pain.    Calcium Carbonate Antacid (OSCAR-SELTZER ANTACID OR) Take by mouth.    cyanocobalamine 1000 MCG Oral Tab Take 1 tablet (1,000 mcg total) by mouth daily.    amphetamine-dextroamphetamine 20 MG Oral Tab Take 1 tablet (20 mg total) by mouth daily.    FLUoxetine HCl 20 MG Oral Tab Take 1 tablet (20 mg total) by mouth daily.       Medical History:  She  has a past medical history of Allergic rhinitis (April-), Anxiety, Asthma (), Attention deficit hyperactivity disorder (ADHD), Depression, High blood pressure, and Visual impairment.  Surgical History:  She  has a past surgical history that includes needle biopsy right (Right, 2020); ; Fracture surgery (Left); cholecystectomy; and  ().   Family History:  Her family history includes Breast Cancer in her paternal cousin female; Cancer in her father and mother.  Social History:  She  reports that she quit smoking about 15 years ago. Her smoking use included cigarettes. She started smoking about 45 years ago. She has a 30 pack-year smoking history. She has never used smokeless tobacco. She reports current alcohol use of about 2.0 standard drinks of alcohol per week. She reports current drug use. Drug: Cannabis.    Tobacco:  She smoked tobacco in the past but quit greater than 12 months ago.  Social History     Tobacco Use   Smoking Status Former    Current packs/day: 0.00    Average packs/day: 1 pack/day for 30.0 years (30.0 ttl pk-yrs)    Types: Cigarettes    Start date: 1980    Quit date: 2010    Years since quitting: 15.2   Smokeless Tobacco Never          CAGE Alcohol Screen:   CAGE screening  score of 0 on 3/13/2025, showing low risk of alcohol abuse.    Patient Care Team:  Tarah Cardenas DO as PCP - General (Family Medicine)  Caleb Lakhani, PT as Physical Therapist    Review of Systems  GENERAL: feels well otherwise  SKIN: denies any unusual skin lesions  EYES: denies blurred vision or double vision  HEENT: denies nasal congestion, sinus pain or ST  LUNGS: denies shortness of breath with exertion  CARDIOVASCULAR: denies chest pain on exertion  GI: denies abdominal pain, denies heartburn  : denies dysuria, vaginal discharge or itching, no complaint of urinary incontinence   MUSCULOSKELETAL: denies back pain  NEURO:  headaches  HEMATOLOGIC: denies hx of anemia  ENDOCRINE: denies thyroid history  ALL/ASTHMA: denies asthma    Objective:   Physical Exam  General Appearance:  Alert, cooperative, no distress, appears stated age   Head:  Normocephalic, without obvious abnormality, atraumatic   Eyes:  PERRL, conjunctiva/corneas clear, EOM's intact both eyes   Ears:  Normal TM's and external ear canals, both ears   Nose: Nares normal, septum midline,mucosa normal, no drainage or sinus tenderness   Throat: Lips, mucosa, and tongue normal; teeth and gums normal   Neck: Supple, symmetrical, trachea midline, no adenopathy;  thyroid: not enlarged, symmetric, no tenderness/mass/nodules; no carotid bruit or JVD   Back:   Symmetric, no curvature, ROM normal, no CVA tenderness   Lungs:   Clear to auscultation bilaterally, respirations unlabored   Heart:  Regular rate and rhythm, S1 and S2 normal, no murmur, rub, or gallop   Abdomen:   Soft, non-tender, bowel sounds active all four quadrants,  no masses, no organomegaly   Pelvic: Deferred   Extremities: Extremities normal, atraumatic, no cyanosis or edema   Pulses: 2+ and symmetric   Skin: Skin color, texture, turgor normal, no rashes or lesions   Lymph nodes: Cervical, supraclavicular, and axillary nodes normal   Neurologic: Normal       /60   Pulse 74   Resp  16   Ht 5' 4\" (1.626 m)   Wt 169 lb (76.7 kg)   SpO2 94%   BMI 29.01 kg/m²  Estimated body mass index is 29.01 kg/m² as calculated from the following:    Height as of this encounter: 5' 4\" (1.626 m).    Weight as of this encounter: 169 lb (76.7 kg).    Medicare Hearing Assessment:   Hearing Screening    Screening Method: Whisper Test  Whisper Test Result: Pass         Visual Acuity:   Right Eye Visual Acuity: Corrected Right Eye Chart Acuity: 20/30   Left Eye Visual Acuity: Corrected Left Eye Chart Acuity: 20/30   Both Eyes Visual Acuity: Corrected Both Eyes Chart Acuity: 20/20   Able To Tolerate Visual Acuity: Yes        Assessment & Plan:   Kaley Thorpe is a 69 year old female who presents for a Medicare Assessment.     1. Encounter for annual health examination (Primary)  -     Detailed, Mod Complex (99214)  2. Elevated hemoglobin- stable monitor   -     Detailed, Mod Complex (56520)  3. Hereditary hemochromatosis- seeing hematology   -     Detailed, Mod Complex (99214)  4. Major depressive disorder with single episode, in full remission- seeing psychiatry   -     Detailed, Mod Complex (32228)  5. Smokers' cough (HCC)- stable monitor   -     Detailed, Mod Complex (21717)  6. Type 2 DM with CKD stage 3 and hypertension (HCC)- stable cpm  -     Hemoglobin A1C; Future; Expected date: 03/13/2025  -     Lipid Panel; Future; Expected date: 03/13/2025  -     Rosuvastatin Calcium; Take 1 tablet (10 mg total) by mouth nightly.  Dispense: 90 tablet; Refill: 1  -     Detailed, Mod Complex (06907)  7. Elevated cholesterol- stable cpm  -     Detailed, Mod Complex (91750)  8. Hypertension, unspecified type- stable cpm  -     Free T4, (Free Thyroxine); Future; Expected date: 03/13/2025  -     Assay, Thyroid Stim Hormone; Future; Expected date: 03/13/2025  -     Detailed, Mod Complex (55672)  9. Anxiety- seeing psychiatry  -     Detailed, Mod Complex (97468)  10. Attention deficit disorder (ADD) in adult- seeing  psychiatry   -     Detailed, Mod Complex (09776)  11. Dupuytren's contracture- stable monitor   -     Detailed, Mod Complex (72932)  12. Other fatigue- stable monitor   -     Detailed, Mod Complex (11554)  13. Former tobacco use- stable monitor   -     Detailed, Mod Complex (37962)  14. Vitamin D deficiency- stable monitor   -     Vitamin D; Future; Expected date: 03/13/2025  -     Detailed, Mod Complex (90124)  15. Vitamin B12 deficiency- stable monitor   -     Vitamin B12; Future; Expected date: 03/13/2025  -     Detailed, Mod Complex (92251)  16. Syncope, unspecified syncope type- start work up   -     EKG 12 Lead; Future; Expected date: 03/13/2025  -     CARD ECHO 2D DOPPLER (CPT=93306); Future; Expected date: 03/13/2025  -     US CAROTID DOPPLER BILAT - DIAG IMG (CPT=93880); Future; Expected date: 03/13/2025  -     Detailed, Mod Complex (77976)  17. Traumatic injury of head, initial encounter- check CT   -     CT BRAIN OR HEAD (CPT=70450); Future; Expected date: 03/13/2025  -     Detailed, Mod Complex (41364)  18. Postconcussion syndrome- check CT   -     CT BRAIN OR HEAD (CPT=70450); Future; Expected date: 03/13/2025  -     Detailed, Mod Complex (33053)  19. Acute post-traumatic headache, not intractable- check CT   -     CT BRAIN OR HEAD (CPT=70450); Future; Expected date: 03/13/2025  -     Detailed, Mod Complex (96621)  20. SOB (shortness of breath)- start work up and discussed ER parameters   -     EKG 12 Lead; Future; Expected date: 03/13/2025  -     CARD ECHO 2D DOPPLER (CPT=93306); Future; Expected date: 03/13/2025  -     US CAROTID DOPPLER BILAT - DIAG IMG (CPT=93880); Future; Expected date: 03/13/2025  -     Detailed, Mod Complex (10362)  21. Family history of early CAD- stable cpm   -     Rosuvastatin Calcium; Take 1 tablet (10 mg total) by mouth nightly.  Dispense: 90 tablet; Refill: 1  -     Detailed, Mod Complex (35879)    The patient indicates understanding of these issues and agrees to the  plan.  Reinforced healthy diet, lifestyle, and exercise.    Return in 4 weeks (on 4/10/2025).     Tarah Cardenas DO, 3/13/2025     Supplementary Documentation:   General Health:  In the past six months, have you lost more than 10 pounds without trying?: 2 - No  Type of Diet: Balanced  How does the patient maintain a good energy level?:  (none)  How would you describe your daily physical activity?: None  How would you describe your current health state?: Fair  How do you maintain positive mental well-being?: Social Interaction, Visiting Family, Puzzles, Games, Visiting Friends  On a scale of 0 to 10, with 0 being no pain and 10 being severe pain, what is your pain level?: 0 - (None)  In the past six months, have you experienced urine leakage?: 1-Yes  At any time do you feel concerned for the safety/well-being of yourself and/or your children, in your home or elsewhere?: No  Have you had any immunizations at another office such as Influenza, Hepatitis B, Tetanus, or Pneumococcal?: No    Health Maintenance   Topic Date Due    Annual Well Visit  01/01/2025    COVID-19 Vaccine (8 - 2024-25 season) 04/01/2025    Mammogram  12/03/2025    Colorectal Cancer Screening  10/11/2026    Influenza Vaccine  Completed    DEXA Scan  Completed    Annual Depression Screening  Completed    Fall Risk Screening (Annual)  Completed    Pneumococcal Vaccine: 50+ Years  Completed    Zoster Vaccines  Completed    Meningococcal B Vaccine  Aged Out

## 2025-03-14 ENCOUNTER — HOSPITAL ENCOUNTER (OUTPATIENT)
Dept: ULTRASOUND IMAGING | Facility: HOSPITAL | Age: 70
Discharge: HOME | End: 2025-03-14
Attending: FAMILY MEDICINE
Payer: MEDICARE

## 2025-03-14 DIAGNOSIS — R06.02 SOB (SHORTNESS OF BREATH): ICD-10-CM

## 2025-03-14 DIAGNOSIS — R55 SYNCOPE, UNSPECIFIED SYNCOPE TYPE: ICD-10-CM

## 2025-03-14 PROCEDURE — 93880 EXTRACRANIAL BILAT STUDY: CPT | Performed by: FAMILY MEDICINE

## 2025-03-20 ENCOUNTER — LAB ENCOUNTER (OUTPATIENT)
Dept: LAB | Age: 70
End: 2025-03-20
Attending: FAMILY MEDICINE
Payer: MEDICARE

## 2025-03-20 ENCOUNTER — HOSPITAL ENCOUNTER (OUTPATIENT)
Dept: CT IMAGING | Facility: HOSPITAL | Age: 70
Discharge: HOME | End: 2025-03-20
Attending: FAMILY MEDICINE
Payer: MEDICARE

## 2025-03-20 DIAGNOSIS — E55.9 VITAMIN D DEFICIENCY: ICD-10-CM

## 2025-03-20 DIAGNOSIS — E53.8 VITAMIN B12 DEFICIENCY: ICD-10-CM

## 2025-03-20 DIAGNOSIS — F07.81 POSTCONCUSSION SYNDROME: ICD-10-CM

## 2025-03-20 DIAGNOSIS — S09.90XA TRAUMATIC INJURY OF HEAD, INITIAL ENCOUNTER: ICD-10-CM

## 2025-03-20 DIAGNOSIS — E83.110 HEREDITARY HEMOCHROMATOSIS: ICD-10-CM

## 2025-03-20 DIAGNOSIS — I12.9 TYPE 2 DM WITH CKD STAGE 3 AND HYPERTENSION (HCC): ICD-10-CM

## 2025-03-20 DIAGNOSIS — G44.319 ACUTE POST-TRAUMATIC HEADACHE, NOT INTRACTABLE: ICD-10-CM

## 2025-03-20 DIAGNOSIS — E11.22 TYPE 2 DM WITH CKD STAGE 3 AND HYPERTENSION (HCC): ICD-10-CM

## 2025-03-20 DIAGNOSIS — N18.30 TYPE 2 DM WITH CKD STAGE 3 AND HYPERTENSION (HCC): ICD-10-CM

## 2025-03-20 DIAGNOSIS — I10 HYPERTENSION, UNSPECIFIED TYPE: ICD-10-CM

## 2025-03-20 DIAGNOSIS — E53.8 FOLIC ACID DEFICIENCY: ICD-10-CM

## 2025-03-20 LAB
AFP-TM SERPL-MCNC: 5.9 NG/ML
ALBUMIN SERPL-MCNC: 4.8 G/DL (ref 3.2–4.8)
ALBUMIN/GLOB SERPL: 1.7 {RATIO} (ref 1–2)
ALP LIVER SERPL-CCNC: 114 U/L
ALT SERPL-CCNC: 24 U/L
ANION GAP SERPL CALC-SCNC: 9 MMOL/L (ref 0–18)
AST SERPL-CCNC: 26 U/L (ref ?–34)
BASOPHILS # BLD AUTO: 0.11 X10(3) UL (ref 0–0.2)
BASOPHILS NFR BLD AUTO: 1.5 %
BILIRUB SERPL-MCNC: 1.7 MG/DL (ref 0.2–1.1)
BUN BLD-MCNC: 6 MG/DL (ref 9–23)
CALCIUM BLD-MCNC: 10.2 MG/DL (ref 8.7–10.6)
CHLORIDE SERPL-SCNC: 104 MMOL/L (ref 98–112)
CHOLEST SERPL-MCNC: 142 MG/DL (ref ?–200)
CO2 SERPL-SCNC: 27 MMOL/L (ref 21–32)
CREAT BLD-MCNC: 0.93 MG/DL
DEPRECATED HBV CORE AB SER IA-ACNC: 16 NG/ML
EGFRCR SERPLBLD CKD-EPI 2021: 67 ML/MIN/1.73M2 (ref 60–?)
EOSINOPHIL # BLD AUTO: 0.27 X10(3) UL (ref 0–0.7)
EOSINOPHIL NFR BLD AUTO: 3.7 %
ERYTHROCYTE [DISTWIDTH] IN BLOOD BY AUTOMATED COUNT: 13.2 %
EST. AVERAGE GLUCOSE BLD GHB EST-MCNC: 97 MG/DL (ref 68–126)
FASTING PATIENT LIPID ANSWER: YES
FASTING STATUS PATIENT QL REPORTED: YES
FOLATE SERPL-MCNC: 25.4 NG/ML (ref 5.4–?)
GLOBULIN PLAS-MCNC: 2.9 G/DL (ref 2–3.5)
GLUCOSE BLD-MCNC: 103 MG/DL (ref 70–99)
HBA1C MFR BLD: 5 % (ref ?–5.7)
HCT VFR BLD AUTO: 47.6 %
HDLC SERPL-MCNC: 72 MG/DL (ref 40–59)
HGB BLD-MCNC: 16.3 G/DL
IMM GRANULOCYTES # BLD AUTO: 0.02 X10(3) UL (ref 0–1)
IMM GRANULOCYTES NFR BLD: 0.3 %
IRON SATN MFR SERPL: 26 %
IRON SERPL-MCNC: 108 UG/DL
LDLC SERPL CALC-MCNC: 52 MG/DL (ref ?–100)
LYMPHOCYTES # BLD AUTO: 1.97 X10(3) UL (ref 1–4)
LYMPHOCYTES NFR BLD AUTO: 27.3 %
MCH RBC QN AUTO: 30.8 PG (ref 26–34)
MCHC RBC AUTO-ENTMCNC: 34.2 G/DL (ref 31–37)
MCV RBC AUTO: 90 FL
MONOCYTES # BLD AUTO: 0.46 X10(3) UL (ref 0.1–1)
MONOCYTES NFR BLD AUTO: 6.4 %
NEUTROPHILS # BLD AUTO: 4.39 X10 (3) UL (ref 1.5–7.7)
NEUTROPHILS # BLD AUTO: 4.39 X10(3) UL (ref 1.5–7.7)
NEUTROPHILS NFR BLD AUTO: 60.8 %
NONHDLC SERPL-MCNC: 70 MG/DL (ref ?–130)
OSMOLALITY SERPL CALC.SUM OF ELEC: 288 MOSM/KG (ref 275–295)
PLATELET # BLD AUTO: 237 10(3)UL (ref 150–450)
POTASSIUM SERPL-SCNC: 4.5 MMOL/L (ref 3.5–5.1)
PROT SERPL-MCNC: 7.7 G/DL (ref 5.7–8.2)
RBC # BLD AUTO: 5.29 X10(6)UL
SODIUM SERPL-SCNC: 140 MMOL/L (ref 136–145)
T4 FREE SERPL-MCNC: 1.3 NG/DL (ref 0.8–1.7)
TOTAL IRON BINDING CAPACITY: 414 UG/DL (ref 250–425)
TRANSFERRIN SERPL-MCNC: 331 MG/DL (ref 250–380)
TRIGL SERPL-MCNC: 101 MG/DL (ref 30–149)
TSI SER-ACNC: 2.72 UIU/ML (ref 0.55–4.78)
VIT B12 SERPL-MCNC: >2000 PG/ML (ref 211–911)
VIT D+METAB SERPL-MCNC: 54.2 NG/ML (ref 30–100)
VLDLC SERPL CALC-MCNC: 14 MG/DL (ref 0–30)
WBC # BLD AUTO: 7.2 X10(3) UL (ref 4–11)

## 2025-03-20 PROCEDURE — 85025 COMPLETE CBC W/AUTO DIFF WBC: CPT

## 2025-03-20 PROCEDURE — 82728 ASSAY OF FERRITIN: CPT

## 2025-03-20 PROCEDURE — 80053 COMPREHEN METABOLIC PANEL: CPT

## 2025-03-20 PROCEDURE — 82105 ALPHA-FETOPROTEIN SERUM: CPT

## 2025-03-20 PROCEDURE — 82306 VITAMIN D 25 HYDROXY: CPT

## 2025-03-20 PROCEDURE — 83036 HEMOGLOBIN GLYCOSYLATED A1C: CPT

## 2025-03-20 PROCEDURE — 82607 VITAMIN B-12: CPT

## 2025-03-20 PROCEDURE — 36415 COLL VENOUS BLD VENIPUNCTURE: CPT

## 2025-03-20 PROCEDURE — 80061 LIPID PANEL: CPT

## 2025-03-20 PROCEDURE — 82746 ASSAY OF FOLIC ACID SERUM: CPT

## 2025-03-20 PROCEDURE — 84439 ASSAY OF FREE THYROXINE: CPT

## 2025-03-20 PROCEDURE — 83550 IRON BINDING TEST: CPT

## 2025-03-20 PROCEDURE — 84443 ASSAY THYROID STIM HORMONE: CPT

## 2025-03-20 PROCEDURE — 70450 CT HEAD/BRAIN W/O DYE: CPT | Performed by: FAMILY MEDICINE

## 2025-03-20 PROCEDURE — 83540 ASSAY OF IRON: CPT

## 2025-03-21 DIAGNOSIS — I49.9 IRREGULAR HEART RATE: ICD-10-CM

## 2025-03-21 DIAGNOSIS — R06.02 SOB (SHORTNESS OF BREATH): ICD-10-CM

## 2025-03-21 DIAGNOSIS — R55 SYNCOPE, UNSPECIFIED SYNCOPE TYPE: Primary | ICD-10-CM

## 2025-03-21 LAB
ATRIAL RATE: 67 BPM
P AXIS: 61 DEGREES
P-R INTERVAL: 168 MS
Q-T INTERVAL: 426 MS
QRS DURATION: 78 MS
QTC CALCULATION (BEZET): 450 MS
R AXIS: 68 DEGREES
T AXIS: 69 DEGREES
VENTRICULAR RATE: 67 BPM

## 2025-03-27 ENCOUNTER — HOSPITAL ENCOUNTER (OUTPATIENT)
Dept: CV DIAGNOSTICS | Facility: HOSPITAL | Age: 70
Discharge: HOME | End: 2025-03-27
Attending: FAMILY MEDICINE
Payer: MEDICARE

## 2025-03-27 DIAGNOSIS — I49.9 IRREGULAR HEART RATE: ICD-10-CM

## 2025-03-27 DIAGNOSIS — R06.02 SOB (SHORTNESS OF BREATH): ICD-10-CM

## 2025-03-27 DIAGNOSIS — R55 SYNCOPE, UNSPECIFIED SYNCOPE TYPE: ICD-10-CM

## 2025-03-27 PROCEDURE — 93306 TTE W/DOPPLER COMPLETE: CPT | Performed by: FAMILY MEDICINE

## 2025-03-27 PROCEDURE — 93247 EXT ECG>7D<15D SCAN A/R: CPT | Performed by: FAMILY MEDICINE

## 2025-03-27 PROCEDURE — 93246 EXT ECG>7D<15D RECORDING: CPT | Performed by: FAMILY MEDICINE

## 2025-04-17 ENCOUNTER — OFFICE VISIT (OUTPATIENT)
Dept: FAMILY MEDICINE CLINIC | Facility: CLINIC | Age: 70
End: 2025-04-17
Payer: MEDICARE

## 2025-04-17 VITALS
WEIGHT: 168 LBS | RESPIRATION RATE: 16 BRPM | DIASTOLIC BLOOD PRESSURE: 86 MMHG | HEIGHT: 64 IN | OXYGEN SATURATION: 98 % | HEART RATE: 86 BPM | BODY MASS INDEX: 28.68 KG/M2 | SYSTOLIC BLOOD PRESSURE: 124 MMHG

## 2025-04-17 DIAGNOSIS — R06.02 SOB (SHORTNESS OF BREATH): ICD-10-CM

## 2025-04-17 DIAGNOSIS — I95.9 HYPOTENSION, UNSPECIFIED HYPOTENSION TYPE: ICD-10-CM

## 2025-04-17 DIAGNOSIS — E11.22 TYPE 2 DM WITH CKD STAGE 3 AND HYPERTENSION (HCC): Primary | ICD-10-CM

## 2025-04-17 DIAGNOSIS — N18.30 TYPE 2 DM WITH CKD STAGE 3 AND HYPERTENSION (HCC): Primary | ICD-10-CM

## 2025-04-17 DIAGNOSIS — R42 DIZZINESS: ICD-10-CM

## 2025-04-17 DIAGNOSIS — R55 SYNCOPE, UNSPECIFIED SYNCOPE TYPE: ICD-10-CM

## 2025-04-17 DIAGNOSIS — R21 RASH: ICD-10-CM

## 2025-04-17 DIAGNOSIS — G44.319 ACUTE POST-TRAUMATIC HEADACHE, NOT INTRACTABLE: ICD-10-CM

## 2025-04-17 DIAGNOSIS — I12.9 TYPE 2 DM WITH CKD STAGE 3 AND HYPERTENSION (HCC): Primary | ICD-10-CM

## 2025-04-17 DIAGNOSIS — R06.83 SNORING: ICD-10-CM

## 2025-04-17 DIAGNOSIS — F07.81 POSTCONCUSSION SYNDROME: ICD-10-CM

## 2025-04-17 DIAGNOSIS — I31.39 PERICARDIAL EFFUSION (HCC): ICD-10-CM

## 2025-04-17 LAB
GLUCOSE BLOOD: 103
TEST STRIP LOT #: NORMAL NUMERIC

## 2025-04-17 PROCEDURE — 1160F RVW MEDS BY RX/DR IN RCRD: CPT | Performed by: FAMILY MEDICINE

## 2025-04-17 PROCEDURE — 99215 OFFICE O/P EST HI 40 MIN: CPT | Performed by: FAMILY MEDICINE

## 2025-04-17 PROCEDURE — 1159F MED LIST DOCD IN RCRD: CPT | Performed by: FAMILY MEDICINE

## 2025-04-17 PROCEDURE — 82947 ASSAY GLUCOSE BLOOD QUANT: CPT | Performed by: FAMILY MEDICINE

## 2025-04-17 RX ORDER — BLOOD SUGAR DIAGNOSTIC
1 STRIP MISCELLANEOUS DAILY
COMMUNITY
Start: 2025-03-14

## 2025-04-17 RX ORDER — FLUOCINONIDE 0.5 MG/G
CREAM TOPICAL
Qty: 30 G | Refills: 0 | Status: SHIPPED | OUTPATIENT
Start: 2025-04-17

## 2025-04-17 RX ORDER — BLOOD-GLUCOSE METER
1 EACH MISCELLANEOUS DAILY
COMMUNITY
Start: 2025-03-17

## 2025-04-17 RX ORDER — LANCETS 33 GAUGE
1 EACH MISCELLANEOUS DAILY
COMMUNITY
Start: 2025-03-14

## 2025-04-17 RX ORDER — METFORMIN HYDROCHLORIDE 500 MG/1
500 TABLET, EXTENDED RELEASE ORAL 2 TIMES DAILY
COMMUNITY
Start: 2025-03-05

## 2025-04-17 NOTE — PROGRESS NOTES
The following individual(s) verbally consented to be recorded using ambient AI listening technology and understand that they can each withdraw their consent to this listening technology at any point by asking the clinician to turn off or pause the recording:    Patient name: Kaley Thorpe  Additional names:  n/a      Kaley Thorpe is a 69 year old female here for f/u on MMP  HPI:     History of Present Illness  Kaley Thorpe is a 69-year-old female with diabetes and recent head trauma who presents for follow-up after a syncopal episode.    She experienced a syncopal episode resulting in loss of consciousness and a head injury, leading to increased sleepiness and depression. Dizziness occurs, especially when standing quickly, with a significant episode when bending over. Initial post-fall headaches have resolved.    She reports decreased motivation and attributes it to worsening depression since the head trauma. Her sleep pattern has shifted to sleeping from 11 PM to 9 or 10 AM, which is atypical for her. No snoring or breathing pauses during sleep, as per her .    She is on Prozac and has discussed with her psychiatrist the possibility of switching to a combination of a stimulant and bupropion. She is also on a GLP-1 medication for diabetes, grazes throughout the day, and has a more substantial dinner. She notes a decreased desire for meat and is concerned about inadequate protein intake.    No current shortness of breath, but she is less active. She has quit smoking and reports anxiety, exacerbated by external stressors.    She has a history of concussions and reports memory issues, attributing them to aging. No new medications causing recent skin breakouts, which have improved since removing a cardiac monitor.    She is on metformin and an injectable medication for diabetes, has stopped losartan for blood pressure, and takes rosuvastatin at night for cholesterol management. Her blood  pressure has improved since stopping losartan.       Current Outpatient Medications   Medication Sig Dispense Refill    Blood Glucose Monitoring Suppl (ONETOUCH VERIO FLEX SYSTEM) w/Device Does not apply Kit 1 strip by In Vitro route daily.      Glucose Blood (ONETOUCH VERIO) In Vitro Strip 1 strip by In Vitro route daily.      Lancets (ONETOUCH DELICA PLUS MWBFCR18O) Does not apply Misc 1 strip by In Vitro route daily.      metFORMIN  MG Oral Tablet 24 Hr Take 1 tablet (500 mg total) by mouth 2 (two) times daily.      folic acid 1 MG Oral Tab TAKE 1 TABLET BY MOUTH EVERY DAY 90 tablet 0    OZEMPIC, 2 MG/DOSE, 8 MG/3ML Subcutaneous Solution Pen-injector       zolpidem 10 MG Oral Tab Take 1 tablet (10 mg total) by mouth nightly as needed. AT BEDTIME      aspirin 81 MG Oral Tab EC Take 1 tablet (81 mg total) by mouth daily.      REXULTI 4 MG Oral Tab Take 1 tablet by mouth daily.      VYVANSE 60 MG Oral Cap Take 1 capsule (60 mg total) by mouth every morning.      ibuprofen 200 MG Oral Tab Take 1 tablet (200 mg total) by mouth every 6 (six) hours as needed for Pain.      Calcium Carbonate Antacid (OSCAR-SELTZER ANTACID OR) Take by mouth.      cyanocobalamine 1000 MCG Oral Tab Take 1 tablet (1,000 mcg total) by mouth daily.      amphetamine-dextroamphetamine 20 MG Oral Tab Take 1 tablet (20 mg total) by mouth daily.      FLUoxetine HCl 20 MG Oral Tab Take 1 tablet (20 mg total) by mouth daily.  0    rosuvastatin 10 MG Oral Tab Take 1 tablet (10 mg total) by mouth nightly. 90 tablet 1      Past Medical History:    Allergic rhinitis    Anxiety    Asthma (HCC)    as a young adult    Attention deficit hyperactivity disorder (ADHD)    Depression    High blood pressure    Visual impairment    glasses      Past Surgical History:   Procedure Laterality Date          Cholecystectomy      Fracture surgery Left     wrist    Needle biopsy right Right 2020    Stereo            Family History   Problem  Relation Age of Onset    Cancer Mother         lung    Cancer Father         lung    Breast Cancer Paternal Cousin Female         2 OF THEM      Social History:  Social History     Socioeconomic History    Marital status:    Tobacco Use    Smoking status: Former     Current packs/day: 0.00     Average packs/day: 1 pack/day for 30.0 years (30.0 ttl pk-yrs)     Types: Cigarettes     Start date: 1/1/1980     Quit date: 1/1/2010     Years since quitting: 15.3    Smokeless tobacco: Never   Vaping Use    Vaping status: Never Used   Substance and Sexual Activity    Alcohol use: Yes     Alcohol/week: 2.0 standard drinks of alcohol     Types: 2 Glasses of wine per week     Comment: daily couple glasses of wine.    Drug use: Yes     Types: Cannabis     Comment: not using      Occ: . : . Children: .   Exercise: minimal.  Diet: watches minimally     REVIEW OF SYSTEMS:   GENERAL: feels well otherwise  SKIN: denies any unusual skin lesions  EYES:denies blurred vision or double vision  HEENT: denies nasal congestion, sinus pain or ST  LUNGS: denies shortness of breath with exertion  CARDIOVASCULAR: denies chest pain on exertion  GI: denies abdominal pain,denies heartburn  : denies nocturia or changes in stream  MUSCULOSKELETAL: denies back pain  NEURO: denies headaches  PSYCHE: denies depression or anxiety  HEMATOLOGIC: denies hx of anemia  ENDOCRINE: denies thyroid history  ALL/ASTHMA: denies asthma    EXAM:   /86   Pulse 86   Resp 16   Ht 5' 4\" (1.626 m)   Wt 168 lb (76.2 kg)   SpO2 98%   BMI 28.84 kg/m²   Body mass index is 28.84 kg/m².   GENERAL: well developed, well nourished,in no apparent distress  SKIN: no rashes,no suspicious lesions  HEENT: atraumatic, normocephalic,ears and throat are clear  EYES:PERRLA, EOMI, normal optic disk,conjunctiva are clear  NECK: supple,no adenopathy,no bruits, no JVD  CHEST: no chest tenderness  BREAST: no dominant or suspicious mass  LUNGS: clear to  auscultation  CARDIO: RRR without murmur  GI: good BS's,no masses, HSM or tenderness   EXTREMITIES: no cyanosis, clubbing or edema  NEURO: Oriented times three,cranial nerves are intact,motor and sensory are grossly intact    ASSESSMENT AND PLAN:   Kaley Thorpe is a 69 year old female who presents with   1. Postconcussion syndrome    - Physical Therapy Referral - [facility]    2. Acute post-traumatic headache, not intractable    - Physical Therapy Referral - [facility]    3. Dizziness    - HemoCue Glucose 201 (Glucose blood test)    4. SOB (shortness of breath)  See cardiology    5. Pericardial effusion (HCC)    - Cardio Referral - Internal  - DIETITIAN EDUCATION INITIAL, DIET (INTERNAL)    6. Syncope, unspecified syncope type    - Cardio Referral - Internal    7. Rash    - fluocinonide 0.05 % External Cream; Apply bid For 10-14 days  Dispense: 30 g; Refill: 0    8. Snoring    - Diagnostic Sleep Study-split night PAP implemented if criteria met  - General sleep study; Future    9. Type 2 DM with CKD stage 3 and hypertension (HCC)    - DIETITIAN EDUCATION INITIAL, DIET (INTERNAL)  - HemoCue Glucose 201 (Glucose blood test)    10. Hypotension, unspecified hypotension type  Increase hydration       RTC 1 mo or sooner if needed.

## 2025-07-22 ENCOUNTER — OFFICE VISIT (OUTPATIENT)
Dept: FAMILY MEDICINE CLINIC | Facility: CLINIC | Age: 70
End: 2025-07-22
Payer: MEDICARE

## 2025-07-22 VITALS
WEIGHT: 170 LBS | BODY MASS INDEX: 29.02 KG/M2 | RESPIRATION RATE: 16 BRPM | DIASTOLIC BLOOD PRESSURE: 84 MMHG | OXYGEN SATURATION: 98 % | HEART RATE: 87 BPM | SYSTOLIC BLOOD PRESSURE: 132 MMHG | HEIGHT: 64 IN

## 2025-07-22 DIAGNOSIS — E83.110 HEREDITARY HEMOCHROMATOSIS: ICD-10-CM

## 2025-07-22 DIAGNOSIS — R06.83 SNORING: ICD-10-CM

## 2025-07-22 DIAGNOSIS — N18.30 TYPE 2 DM WITH CKD STAGE 3 AND HYPERTENSION (HCC): Primary | ICD-10-CM

## 2025-07-22 DIAGNOSIS — F07.81 POSTCONCUSSION SYNDROME: ICD-10-CM

## 2025-07-22 DIAGNOSIS — R55 SYNCOPE, UNSPECIFIED SYNCOPE TYPE: ICD-10-CM

## 2025-07-22 DIAGNOSIS — I95.9 HYPOTENSION, UNSPECIFIED HYPOTENSION TYPE: ICD-10-CM

## 2025-07-22 DIAGNOSIS — I12.9 TYPE 2 DM WITH CKD STAGE 3 AND HYPERTENSION (HCC): Primary | ICD-10-CM

## 2025-07-22 DIAGNOSIS — E11.22 TYPE 2 DM WITH CKD STAGE 3 AND HYPERTENSION (HCC): Primary | ICD-10-CM

## 2025-07-22 PROCEDURE — 1160F RVW MEDS BY RX/DR IN RCRD: CPT | Performed by: FAMILY MEDICINE

## 2025-07-22 PROCEDURE — 99214 OFFICE O/P EST MOD 30 MIN: CPT | Performed by: FAMILY MEDICINE

## 2025-07-22 PROCEDURE — 1159F MED LIST DOCD IN RCRD: CPT | Performed by: FAMILY MEDICINE

## 2025-07-22 NOTE — PROGRESS NOTES
The following individual(s) verbally consented to be recorded using ambient AI listening technology and understand that they can each withdraw their consent to this listening technology at any point by asking the clinician to turn off or pause the recording:    Patient name: Kaley Thorpe  Additional names:  n/a      Kaley Thorpe is a 70 year old female here for f/u on MMP  HPI:     S/p syncope ; no further episodes   S/p cardiac consult   No further near syncopal   No low BS  Fasting blood sugars normal   Blood pressure ok here / home cuff not accurate     Patient denies chest pain, shortness of breath, dizziness, and lightheadedness. No exertional symptoms.       Current Outpatient Medications   Medication Sig Dispense Refill    Blood Glucose Monitoring Suppl (ONETOUCH VERIO FLEX SYSTEM) w/Device Does not apply Kit 1 strip by In Vitro route daily.      Glucose Blood (ONETOUCH VERIO) In Vitro Strip 1 strip by In Vitro route daily.      Lancets (ONETOUCH DELICA PLUS [id number]) Does not apply Misc 1 strip by In Vitro route daily.      fluocinonide 0.05 % External Cream Apply bid For 10-14 days 30 g 0    rosuvastatin 10 MG Oral Tab Take 1 tablet (10 mg total) by mouth nightly. 90 tablet 1    folic acid 1 MG Oral Tab TAKE 1 TABLET BY MOUTH EVERY DAY 90 tablet 0    OZEMPIC, 2 MG/DOSE, 8 MG/3ML Subcutaneous Solution Pen-injector       zolpidem 10 MG Oral Tab Take 1 tablet (10 mg total) by mouth nightly as needed. AT BEDTIME      aspirin 81 MG Oral Tab EC Take 1 tablet (81 mg total) by mouth daily.      REXULTI 4 MG Oral Tab Take 1 tablet by mouth daily.      VYVANSE 60 MG Oral Cap Take 1 capsule (60 mg total) by mouth every morning.      ibuprofen 200 MG Oral Tab Take 1 tablet (200 mg total) by mouth every 6 (six) hours as needed for Pain.      Calcium Carbonate Antacid (OSCAR-SELTZER ANTACID OR) Take by mouth.      cyanocobalamine 1000 MCG Oral Tab Take 1 tablet (1,000 mcg total) by mouth daily.       amphetamine-dextroamphetamine 20 MG Oral Tab Take 1 tablet (20 mg total) by mouth daily.      FLUoxetine HCl 20 MG Oral Tab Take 1 tablet (20 mg total) by mouth daily.  0    metFORMIN  MG Oral Tablet 24 Hr Take 1 tablet (500 mg total) by mouth 2 (two) times daily. (Patient not taking: Reported on 2025)        Past Medical History:    Allergic rhinitis    Anxiety    Asthma (HCC)    as a young adult    Attention deficit hyperactivity disorder (ADHD)    Depression    High blood pressure    Visual impairment    glasses      Past Surgical History:   Procedure Laterality Date          Cholecystectomy      Fracture surgery Left     wrist    Needle biopsy right Right 2020    Stereo            Family History   Problem Relation Age of Onset    Cancer Mother         lung    Cancer Father         lung    Breast Cancer Paternal Cousin Female         2 OF THEM      Social History:  Social History     Socioeconomic History    Marital status:    Tobacco Use    Smoking status: Former     Current packs/day: 0.00     Average packs/day: 1 pack/day for 30.0 years (30.0 ttl pk-yrs)     Types: Cigarettes     Start date: 1980     Quit date: 2010     Years since quitting: 15.5    Smokeless tobacco: Never   Vaping Use    Vaping status: Never Used   Substance and Sexual Activity    Alcohol use: Yes     Alcohol/week: 2.0 standard drinks of alcohol     Types: 2 Glasses of wine per week     Comment: daily couple glasses of wine.    Drug use: Yes     Types: Cannabis     Comment: not using      Occ: . : . Children: .   Exercise: minimal.  Diet: watches minimally     REVIEW OF SYSTEMS:   GENERAL: feels well otherwise  SKIN: denies any unusual skin lesions  EYES:denies blurred vision or double vision  HEENT: denies nasal congestion, sinus pain or ST  LUNGS: denies shortness of breath with exertion  CARDIOVASCULAR: denies chest pain on exertion  GI: denies abdominal pain,denies heartburn  :  denies nocturia or changes in stream  MUSCULOSKELETAL: denies back pain  NEURO: denies headaches  PSYCHE: denies depression or anxiety  HEMATOLOGIC: denies hx of anemia  ENDOCRINE: denies thyroid history  ALL/ASTHMA: denies asthma    EXAM:   /84   Pulse 87   Resp 16   Ht 5' 4\" (1.626 m)   Wt 170 lb (77.1 kg)   SpO2 98%   BMI 29.18 kg/m²   Body mass index is 29.18 kg/m².   GENERAL: well developed, well nourished,in no apparent distress  SKIN: no rashes,no suspicious lesions  HEENT: atraumatic, normocephalic,ears and throat are clear  EYES:PERRLA, EOMI, normal optic disk,conjunctiva are clear  NECK: supple,no adenopathy,no bruits, no JVD  CHEST: no chest tenderness  BREAST: no dominant or suspicious mass  LUNGS: clear to auscultation  CARDIO: RRR without murmur  GI: good BS's,no masses, HSM or tenderness   EXTREMITIES: no cyanosis, clubbing or edema  NEURO: Oriented times three,cranial nerves are intact,motor and sensory are grossly intact    ASSESSMENT AND PLAN:   Kaley Thorpe is a 70 year old female who presents with     1. Type 2 DM with CKD stage 3 and hypertension (HCC)  Cpm     2. Hypotension, unspecified hypotension type  Cpm     3. Hereditary hemochromatosis  See heme     4. Snoring  Discussed     5. Postconcussion syndrome  Resolved     6. Syncope, unspecified syncope type  No further episodes, monitor     RTC 1 mo or sooner if needed.

## (undated) DEVICE — SOL  .9 1000ML BTL

## (undated) DEVICE — INSUFFLATION NEEDLE TO ESTABLISH PNEUMOPERITONEUM.: Brand: INSUFFLATION NEEDLE

## (undated) DEVICE — ZIPWIRE GUIDE .038X150 STR/STF

## (undated) DEVICE — 40580 - THE PINK PAD - ADVANCED TRENDELENBURG POSITIONING KIT: Brand: 40580 - THE PINK PAD - ADVANCED TRENDELENBURG POSITIONING KIT

## (undated) DEVICE — C-ARM: Brand: UNBRANDED

## (undated) DEVICE — TROCAR: Brand: KII SHIELDED BLADED ACCESS SYSTEM

## (undated) DEVICE — MONOFILAMENT ABSORBABLE SUTURE: Brand: MAXON

## (undated) DEVICE — DISPOSABLE LAPAROSCOPIC CLIP APPLIER WITH 20 CLIPS.: Brand: EPIX® UNIVERSAL CLIP APPLIER

## (undated) DEVICE — LIGHT HANDLE

## (undated) DEVICE — Device

## (undated) DEVICE — SCD SLEEVE KNEE HI BLEND

## (undated) DEVICE — TIGERTAIL 5F FLXTIP 70CM

## (undated) DEVICE — TROCAR: Brand: KII® SLEEVE

## (undated) DEVICE — STERILE POLYISOPRENE POWDER-FREE SURGICAL GLOVES: Brand: PROTEXIS

## (undated) DEVICE — CHLORAPREP 26ML APPLICATOR

## (undated) DEVICE — LAP CHOLE/APPY CDS-LF: Brand: MEDLINE INDUSTRIES, INC.

## (undated) DEVICE — UNDYED BRAIDED (POLYGLACTIN 910), SYNTHETIC ABSORBABLE SUTURE: Brand: COATED VICRYL

## (undated) NOTE — Clinical Note
I had the pleasure of seeing Sara Rodriguez on 12/8/2021. Please see my attached note.     Lori Landeros MD FACS  [facility]

## (undated) NOTE — LETTER
[facility]  Phone: [phone]  Right Fax: [phone]  EVALUATION REQUEST PREOP    Sent By:  Mirtha Lyons RN Date: 12/20/21    Patient Name: Martha Perez  Surgery Date: 12/27/2021    CSN: [id number]  [facility]